# Patient Record
Sex: FEMALE | Race: OTHER | NOT HISPANIC OR LATINO | Employment: FULL TIME | URBAN - METROPOLITAN AREA
[De-identification: names, ages, dates, MRNs, and addresses within clinical notes are randomized per-mention and may not be internally consistent; named-entity substitution may affect disease eponyms.]

---

## 2018-09-11 ENCOUNTER — OFFICE VISIT (OUTPATIENT)
Dept: OBGYN CLINIC | Facility: CLINIC | Age: 33
End: 2018-09-11
Payer: COMMERCIAL

## 2018-09-11 VITALS
SYSTOLIC BLOOD PRESSURE: 122 MMHG | HEIGHT: 64 IN | DIASTOLIC BLOOD PRESSURE: 70 MMHG | BODY MASS INDEX: 35 KG/M2 | WEIGHT: 205 LBS

## 2018-09-11 DIAGNOSIS — N89.8 VAGINAL DISCHARGE: ICD-10-CM

## 2018-09-11 DIAGNOSIS — Z11.3 SCREENING EXAMINATION FOR VENEREAL DISEASE: ICD-10-CM

## 2018-09-11 DIAGNOSIS — Z30.013 ENCOUNTER FOR INITIAL PRESCRIPTION OF INJECTABLE CONTRACEPTIVE: ICD-10-CM

## 2018-09-11 DIAGNOSIS — L29.2 VULVAR ITCHING: ICD-10-CM

## 2018-09-11 DIAGNOSIS — N76.0 BACTERIAL VAGINOSIS: Primary | ICD-10-CM

## 2018-09-11 DIAGNOSIS — B96.89 BACTERIAL VAGINOSIS: Primary | ICD-10-CM

## 2018-09-11 LAB — SL AMB POCT WET MOUNT: ABNORMAL

## 2018-09-11 PROCEDURE — 87210 SMEAR WET MOUNT SALINE/INK: CPT | Performed by: PHYSICIAN ASSISTANT

## 2018-09-11 PROCEDURE — 99204 OFFICE O/P NEW MOD 45 MIN: CPT | Performed by: PHYSICIAN ASSISTANT

## 2018-09-11 RX ORDER — METRONIDAZOLE 7.5 MG/G
1 GEL VAGINAL
Qty: 70 G | Refills: 0 | Status: SHIPPED | OUTPATIENT
Start: 2018-09-11 | End: 2018-09-16

## 2018-09-11 RX ORDER — MEDROXYPROGESTERONE ACETATE 150 MG/ML
150 INJECTION, SUSPENSION INTRAMUSCULAR
Qty: 1 ML | Refills: 2 | Status: SHIPPED | OUTPATIENT
Start: 2018-09-11 | End: 2019-09-24

## 2018-09-11 RX ORDER — CLOBETASOL PROPIONATE 0.5 MG/G
OINTMENT TOPICAL 2 TIMES DAILY
Qty: 45 G | Refills: 0 | Status: SHIPPED | OUTPATIENT
Start: 2018-09-11 | End: 2019-09-24 | Stop reason: SDUPTHER

## 2018-09-11 NOTE — ASSESSMENT & PLAN NOTE
Reviewed bacterial vaginosis on exam today  Will plan to treat with Metrogel x 5 nights  For prevention: Recommend acidophilus or yogurt daily, avoid irritating soaps or lotions, no tight fitted pants or underwear, avoid bubble baths, do not stay in wet swimsuit  Full vaginitis panel done today

## 2018-09-11 NOTE — PROGRESS NOTES
Assessment/Plan:    Encounter for initial prescription of injectable contraceptive  Reviewed longer term birth control options including NuvaRing, Depo Provera, Nexplanon, IUD  Patient most interested in Depo Provera  Reviewed common bleeding patterns as well as risk of weight gain, mood changes, and decreased bone mineral density after 2 years of therapy  Patient just had menses  Will plan to come back tomorrow to get shot, will need to take negative pregnancy test in office  Bacterial vaginosis  Reviewed bacterial vaginosis on exam today  Will plan to treat with Metrogel x 5 nights  For prevention: Recommend acidophilus or yogurt daily, avoid irritating soaps or lotions, no tight fitted pants or underwear, avoid bubble baths, do not stay in wet swimsuit  Full vaginitis panel done today  Vulvar itching  Although clear bacterial vaginosis on exam today, suspect patient may have lichen sclerosus as well with history of her symptoms  Script for Clobetasol ointment given  Aware to apply thin amount only to outside BID for max of 2 weeks at a time  Problem List Items Addressed This Visit     Vulvar itching     Although clear bacterial vaginosis on exam today, suspect patient may have lichen sclerosus as well with history of her symptoms  Script for Clobetasol ointment given  Aware to apply thin amount only to outside BID for max of 2 weeks at a time  Relevant Medications    clobetasol (TEMOVATE) 0 05 % ointment    Bacterial vaginosis - Primary     Reviewed bacterial vaginosis on exam today  Will plan to treat with Metrogel x 5 nights  For prevention: Recommend acidophilus or yogurt daily, avoid irritating soaps or lotions, no tight fitted pants or underwear, avoid bubble baths, do not stay in wet swimsuit  Full vaginitis panel done today            Relevant Medications    metroNIDAZOLE (METROGEL) 0 75 % vaginal gel    Encounter for initial prescription of injectable contraceptive Reviewed longer term birth control options including NuvaRing, Depo Provera, Nexplanon, IUD  Patient most interested in Depo Provera  Reviewed common bleeding patterns as well as risk of weight gain, mood changes, and decreased bone mineral density after 2 years of therapy  Patient just had menses  Will plan to come back tomorrow to get shot, will need to take negative pregnancy test in office  Relevant Medications    medroxyPROGESTERone (DEPO-PROVERA) 150 mg/mL injection      Other Visit Diagnoses     Screening examination for venereal disease        Relevant Orders    HIV 1/2 AG-AB combo    Hepatitis C antibody    RPR    GP Chlamydia + Gonorrhea, Liquid-Based    Vaginal discharge        Relevant Orders    GP Chlamydia + Gonorrhea, Liquid-Based    GP Vaginitis/Vaginosis W/O PAP W/O HPV Plus    POCT wet mount (Completed)            Subjective:      Patient ID: Anita Bryant is a 29 y o  female  HPI  30 yo seen for recurrent vulvar itching and discharge  Patient reports recently moved from [place]  Previous OB/GYN persistently treated for BV, patient reports symptoms would improve during treatment but return as soon as stopped  Reports worse is the uncontrollable itching, wakes her up at night  Reports occasional discharge sometimes thick and white, other times thin  Reports occ fishy odor  Previous obgyn thought it was associated with intercourse, patient stopped intercourse for 6 months and still had issue  Treats with OTC monistat, has been prescribed Diflucan and Metronidazole all oral  Has not used vaginal Metrogel  Denies bowel or bladder issues, no pelvic pain, fever or chills  Would like full STD work up today  Patient would also like to discuss birth control options  Has been on Microgestin in the past, tolerated well but does not want to take a pill everyday  Considering conception in the next 2 years or so     The following portions of the patient's history were reviewed and updated as appropriate:   She  has no past medical history on file  She   Patient Active Problem List    Diagnosis Date Noted    Vulvar itching 09/11/2018    Bacterial vaginosis 09/11/2018    Encounter for initial prescription of injectable contraceptive 09/11/2018     She  has no past surgical history on file  Her family history includes Diabetes in her mother  She  reports that she has never smoked  She has never used smokeless tobacco  She reports that she drinks alcohol  She reports that she does not use drugs  Current Outpatient Prescriptions   Medication Sig Dispense Refill    clobetasol (TEMOVATE) 0 05 % ointment Apply topically 2 (two) times a day Apply thin layer to the affected area twice daily for max of 2 weeks at a time  45 g 0    medroxyPROGESTERone (DEPO-PROVERA) 150 mg/mL injection Inject 1 mL (150 mg total) into a muscle every 3 (three) months 1 mL 2    metroNIDAZOLE (METROGEL) 0 75 % vaginal gel Insert 1 application into the vagina daily at bedtime for 5 days 70 g 0     No current facility-administered medications for this visit  She is allergic to benadryl [diphenhydramine]       Review of Systems   Constitutional: Negative for fatigue, fever and unexpected weight change  HENT: Negative for dental problem and sinus pressure  Eyes: Negative for visual disturbance  Respiratory: Negative for cough, shortness of breath and wheezing  Cardiovascular: Negative for chest pain  Gastrointestinal: Negative for abdominal pain, blood in stool, constipation, diarrhea, nausea and vomiting  Endocrine: Negative for polydipsia  Genitourinary: Positive for vaginal discharge  Negative for difficulty urinating, dyspareunia, dysuria, flank pain, frequency, hematuria, pelvic pain and urgency  Musculoskeletal: Negative for arthralgias and back pain  Neurological: Negative for dizziness, seizures, light-headedness and headaches  Psychiatric/Behavioral: Negative for suicidal ideas   The patient is not nervous/anxious  Objective:      /70 (BP Location: Left arm, Patient Position: Sitting, Cuff Size: Large)   Ht 5' 4" (1 626 m)   Wt 93 kg (205 lb)   BMI 35 19 kg/m²          Physical Exam   Constitutional: She is oriented to person, place, and time  She appears well-developed and well-nourished  Neck: Neck supple  No thyroid mass and no thyromegaly present  Cardiovascular: Normal rate, regular rhythm and normal heart sounds  Exam reveals no gallop and no friction rub  No murmur heard  Pulmonary/Chest: Effort normal and breath sounds normal  No respiratory distress  She has no wheezes  She has no rales  Abdominal: Soft  Normal appearance and bowel sounds are normal  She exhibits no distension and no mass  There is no tenderness  There is no rebound and no guarding  Genitourinary: No breast swelling, tenderness, discharge or bleeding  There is no rash, tenderness, lesion or injury on the right labia  There is no rash, tenderness, lesion or injury on the left labia  Uterus is not deviated, not enlarged and not tender  Cervix exhibits no motion tenderness, no discharge and no friability  Right adnexum displays no mass, no tenderness and no fullness  Left adnexum displays no mass, no tenderness and no fullness  No erythema, tenderness or bleeding in the vagina  No signs of injury around the vagina  Vaginal discharge (thin white discharge in vaginal vault, notable fishy odor  ) found  Genitourinary Comments: Mild hypopigmentation in groin and around clitoris where patient reports most of the itching is occurring  Lymphadenopathy:     She has no cervical adenopathy  Right: No inguinal and no supraclavicular adenopathy present  Left: No inguinal and no supraclavicular adenopathy present  Neurological: She is alert and oriented to person, place, and time  Skin: Skin is warm and dry  No rash noted  No erythema  No pallor     Psychiatric: She has a normal mood and affect  Her behavior is normal  Judgment and thought content normal        Wet mount: abundant clue cells throughout, no budding yeast or hyphae, no trichomonads

## 2018-09-11 NOTE — ASSESSMENT & PLAN NOTE
Although clear bacterial vaginosis on exam today, suspect patient may have lichen sclerosus as well with history of her symptoms  Script for Clobetasol ointment given  Aware to apply thin amount only to outside BID for max of 2 weeks at a time

## 2018-09-11 NOTE — ASSESSMENT & PLAN NOTE
Reviewed longer term birth control options including NuvaRing, Depo Provera, Nexplanon, IUD  Patient most interested in Depo Provera  Reviewed common bleeding patterns as well as risk of weight gain, mood changes, and decreased bone mineral density after 2 years of therapy  Patient just had menses  Will plan to come back tomorrow to get shot, will need to take negative pregnancy test in office

## 2018-09-11 NOTE — PATIENT INSTRUCTIONS
For prevention: Recommend acidophilus or yogurt daily, avoid irritating soaps or lotions, no tight fitted pants or underwear, avoid bubble baths, do not stay in wet swimsuit

## 2018-09-12 ENCOUNTER — CLINICAL SUPPORT (OUTPATIENT)
Dept: OBGYN CLINIC | Facility: CLINIC | Age: 33
End: 2018-09-12
Payer: COMMERCIAL

## 2018-09-12 VITALS
DIASTOLIC BLOOD PRESSURE: 78 MMHG | SYSTOLIC BLOOD PRESSURE: 120 MMHG | BODY MASS INDEX: 34.15 KG/M2 | WEIGHT: 200 LBS | HEIGHT: 64 IN

## 2018-09-12 DIAGNOSIS — Z30.42 ENCOUNTER FOR SURVEILLANCE OF INJECTABLE CONTRACEPTIVE: Primary | ICD-10-CM

## 2018-09-12 LAB — SL AMB POCT URINE HCG: NEGATIVE

## 2018-09-12 PROCEDURE — 96372 THER/PROPH/DIAG INJ SC/IM: CPT | Performed by: OBSTETRICS & GYNECOLOGY

## 2018-09-12 PROCEDURE — 81025 URINE PREGNANCY TEST: CPT

## 2018-09-12 RX ORDER — MEDROXYPROGESTERONE ACETATE 150 MG/ML
150 INJECTION, SUSPENSION INTRAMUSCULAR ONCE
Status: COMPLETED | OUTPATIENT
Start: 2018-09-12 | End: 2018-09-12

## 2018-09-12 RX ORDER — MEDROXYPROGESTERONE ACETATE 150 MG/ML
150 INJECTION, SUSPENSION INTRAMUSCULAR ONCE
Status: CANCELLED | OUTPATIENT
Start: 2018-09-12 | End: 2018-09-12

## 2018-09-12 RX ADMIN — MEDROXYPROGESTERONE ACETATE 150 MG: 150 INJECTION, SUSPENSION INTRAMUSCULAR at 13:39

## 2018-09-12 NOTE — PROGRESS NOTES
Patient came in for first depo  Tolerated well with no adverse reaction  Pregnancy test was negative

## 2018-09-13 LAB
HCV AB S/CO SERPL IA: <0.1 S/CO RATIO (ref 0–0.9)
HIV 1+2 AB+HIV1 P24 AG SERPL QL IA: NON REACTIVE
RPR SER QL: NON REACTIVE
SL AMB INTERPRETATION: NORMAL

## 2018-09-25 LAB
A VAGINAE DNA VAG NAA+PROBE-LOG#: <3.25
A VAGINAE DNA VAG QL NAA+PROBE: NOT DETECTED
BVAB2 DNA VAG QL NAA+PROBE: DETECTED
DEPRECATED C TRACH RRNA XXX QL PRB: NOT DETECTED
G VAGINALIS DNA SPEC QL NAA+PROBE: DETECTED
G VAGINALIS DNA VAG NAA+PROBE-LOG#: ABNORMAL
LACTOBACILLUS DNA VAG NAA+PROBE-LOG#: <3.25
LACTOBACILLUS DNA VAG NAA+PROBE-LOG#: NOT DETECTED
MEGA1 DNA VAG QL NAA+PROBE: DETECTED
N GONORRHOEA DNA UR QL NAA+PROBE: NOT DETECTED
SL AMB C.ALBICANS BY PCR: NOT DETECTED
SL AMB CANDIDA GENUS: NOT DETECTED
T VAGINALIS RRNA SPEC QL NAA+PROBE: NOT DETECTED

## 2018-12-10 ENCOUNTER — CLINICAL SUPPORT (OUTPATIENT)
Dept: OBGYN CLINIC | Facility: CLINIC | Age: 33
End: 2018-12-10
Payer: COMMERCIAL

## 2018-12-10 VITALS
HEIGHT: 63 IN | SYSTOLIC BLOOD PRESSURE: 118 MMHG | BODY MASS INDEX: 37.74 KG/M2 | WEIGHT: 213 LBS | DIASTOLIC BLOOD PRESSURE: 72 MMHG

## 2018-12-10 DIAGNOSIS — Z30.42 ENCOUNTER FOR SURVEILLANCE OF INJECTABLE CONTRACEPTIVE: Primary | ICD-10-CM

## 2018-12-10 PROCEDURE — 96372 THER/PROPH/DIAG INJ SC/IM: CPT | Performed by: OBSTETRICS & GYNECOLOGY

## 2018-12-10 RX ORDER — MEDROXYPROGESTERONE ACETATE 150 MG/ML
150 INJECTION, SUSPENSION INTRAMUSCULAR ONCE
Status: COMPLETED | OUTPATIENT
Start: 2018-12-10 | End: 2018-12-10

## 2018-12-10 RX ADMIN — MEDROXYPROGESTERONE ACETATE 150 MG: 150 INJECTION, SUSPENSION INTRAMUSCULAR at 14:36

## 2019-09-24 ENCOUNTER — ANNUAL EXAM (OUTPATIENT)
Dept: OBGYN CLINIC | Facility: CLINIC | Age: 34
End: 2019-09-24
Payer: COMMERCIAL

## 2019-09-24 VITALS
DIASTOLIC BLOOD PRESSURE: 80 MMHG | BODY MASS INDEX: 30.56 KG/M2 | SYSTOLIC BLOOD PRESSURE: 120 MMHG | HEIGHT: 64 IN | WEIGHT: 179 LBS

## 2019-09-24 DIAGNOSIS — L29.2 VULVAR ITCHING: ICD-10-CM

## 2019-09-24 DIAGNOSIS — Z11.3 SCREENING EXAMINATION FOR VENEREAL DISEASE: ICD-10-CM

## 2019-09-24 DIAGNOSIS — Z01.419 ROUTINE GYNECOLOGICAL EXAMINATION: Primary | ICD-10-CM

## 2019-09-24 PROCEDURE — S0612 ANNUAL GYNECOLOGICAL EXAMINA: HCPCS | Performed by: PHYSICIAN ASSISTANT

## 2019-09-24 RX ORDER — CLOBETASOL PROPIONATE 0.5 MG/G
OINTMENT TOPICAL 2 TIMES DAILY
Qty: 60 G | Refills: 1 | Status: SHIPPED | OUTPATIENT
Start: 2019-09-24 | End: 2020-10-11 | Stop reason: HOSPADM

## 2019-09-24 NOTE — PROGRESS NOTES
Assessment/Plan   Problem List Items Addressed This Visit        Musculoskeletal and Integument    Vulvar itching     Reviewed itching with patient  Recommend continue Clobetasol ointment  Reviewed with patient importance of only using for 2 weeks at a time  If used too often can over thin the tissues and worsen itching  Can use coconut oil in between to help moisturize skin  Relevant Medications    clobetasol (TEMOVATE) 0 05 % ointment       Other    Routine gynecological examination - Primary     Pap guidelines reviewed  Pap with HPV and STD cultures done today  Return to office for annual or as needed  Relevant Orders    GP PAP + HPV PLUS + CT + GC      Other Visit Diagnoses     Screening examination for venereal disease        Relevant Orders    GP PAP + HPV PLUS + CT + GC          Subjective:     Patient ID: Myles Licona is a 34 y o  y o  female  HPI  35 yo seen for annual exam  Menses regular with no issues  Denies bowel or bladder issues  Would like STD testing today  Had been diagnosed with lichen sclerosus on exam last year  Reports clobetasol ointment helps but as soon as she stops feels like it comes right back again  Denies vaginal discharge or odor  Last pap: Unknown  The following portions of the patient's history were reviewed and updated as appropriate:   She  has a past medical history of Lichen sclerosus of female genitalia  She   Patient Active Problem List    Diagnosis Date Noted    Gynecologic exam normal 09/24/2019    Routine gynecological examination 09/24/2019    Vulvar itching 09/11/2018    Bacterial vaginosis 09/11/2018    Encounter for initial prescription of injectable contraceptive 09/11/2018     She  has no past surgical history on file  Her family history includes Diabetes in her mother  She  reports that she has never smoked  She has never used smokeless tobacco  She reports that she drinks alcohol   She reports that she does not use drugs   Current Outpatient Medications   Medication Sig Dispense Refill    clobetasol (TEMOVATE) 0 05 % ointment Apply topically 2 (two) times a day Apply thin layer to the affected area twice daily for max of 2 weeks at a time  60 g 1     No current facility-administered medications for this visit  She is allergic to diphenhydramine       Menstrual History:  OB History        0    Para   0    Term   0       0    AB   0    Living   0       SAB   0    TAB   0    Ectopic   0    Multiple   0    Live Births   0                Menarche age: 17  Patient's last menstrual period was 2019 (exact date)  Period Cycle (Days): 28  Period Duration (Days): 4  Period Pattern: Regular  Menstrual Flow: Moderate, Light  Dysmenorrhea: None      Review of Systems   Constitutional: Negative for fatigue, fever and unexpected weight change  HENT: Negative for dental problem and sinus pressure  Eyes: Negative for visual disturbance  Respiratory: Negative for cough, shortness of breath and wheezing  Cardiovascular: Negative for chest pain  Gastrointestinal: Negative for abdominal pain, blood in stool, constipation, diarrhea, nausea and vomiting  Endocrine: Negative for polydipsia  Genitourinary: Negative for difficulty urinating, dyspareunia, dysuria, frequency, hematuria, pelvic pain and urgency  Musculoskeletal: Negative for arthralgias and back pain  Neurological: Negative for dizziness, seizures, light-headedness and headaches  Psychiatric/Behavioral: Negative for suicidal ideas  The patient is not nervous/anxious  Objective:  Vitals:    19 1312   BP: 120/80   BP Location: Left arm   Patient Position: Sitting   Cuff Size: Large   Weight: 81 2 kg (179 lb)   Height: 5' 4" (1 626 m)      Physical Exam   Constitutional: She is oriented to person, place, and time  She appears well-developed and well-nourished     Genitourinary: Vagina normal and uterus normal  There is no rash, tenderness, lesion, injury or Bartholin's cyst on the right labia  There is no rash, tenderness, lesion, injury or Bartholin's cyst on the left labia  Vagina exhibits no lesion  No erythema, tenderness or bleeding in the vagina  No signs of injury around the vagina  No vaginal discharge found  Right adnexum does not display mass, does not display tenderness and does not display fullness  Left adnexum does not display mass, does not display tenderness and does not display fullness  Cervix does not exhibit motion tenderness, lesion or discharge  Uterus is not enlarged, tender, exhibiting a mass, irregular (is regular) or mobile  HENT:   Head: Normocephalic and atraumatic  Neck: No thyromegaly present  Cardiovascular: Normal rate, regular rhythm and normal heart sounds  Exam reveals no gallop and no friction rub  No murmur heard  Pulmonary/Chest: Effort normal and breath sounds normal  No respiratory distress  She has no wheezes  Right breast exhibits no inverted nipple, no mass, no nipple discharge, no skin change and no tenderness  Left breast exhibits no inverted nipple, no mass, no nipple discharge, no skin change and no tenderness  No breast swelling  Breasts are symmetrical    Abdominal: Soft  She exhibits no distension and no mass  There is no tenderness  There is no rebound and no guarding  No hernia  Lymphadenopathy:     She has no cervical adenopathy  Right: No inguinal adenopathy present  Left: No inguinal adenopathy present  Neurological: She is alert and oriented to person, place, and time  Skin: Skin is warm and dry  Psychiatric: She has a normal mood and affect   Her behavior is normal

## 2019-09-24 NOTE — ASSESSMENT & PLAN NOTE
Pap guidelines reviewed  Pap with HPV and STD cultures done today  Return to office for annual or as needed

## 2019-09-24 NOTE — ASSESSMENT & PLAN NOTE
Reviewed itching with patient  Recommend continue Clobetasol ointment  Reviewed with patient importance of only using for 2 weeks at a time  If used too often can over thin the tissues and worsen itching  Can use coconut oil in between to help moisturize skin

## 2019-09-30 LAB
DEPRECATED C TRACH RRNA XXX QL PRB: NOT DETECTED
HPV HR 12 DNA CVX QL NAA+PROBE: NOT DETECTED
HPV16 DNA SPEC QL NAA+PROBE: NOT DETECTED
HPV18 DNA SPEC QL NAA+PROBE: NOT DETECTED
N GONORRHOEA DNA UR QL NAA+PROBE: NOT DETECTED
THIN PREP CVX: NORMAL

## 2020-02-13 ENCOUNTER — OFFICE VISIT (OUTPATIENT)
Dept: URGENT CARE | Age: 35
End: 2020-02-13
Payer: COMMERCIAL

## 2020-02-13 ENCOUNTER — HOSPITAL ENCOUNTER (EMERGENCY)
Facility: HOSPITAL | Age: 35
Discharge: HOME | End: 2020-02-13
Attending: EMERGENCY MEDICINE | Admitting: EMERGENCY MEDICINE
Payer: COMMERCIAL

## 2020-02-13 VITALS
SYSTOLIC BLOOD PRESSURE: 132 MMHG | RESPIRATION RATE: 16 BRPM | WEIGHT: 170 LBS | BODY MASS INDEX: 29.02 KG/M2 | HEART RATE: 85 BPM | HEIGHT: 64 IN | OXYGEN SATURATION: 100 % | DIASTOLIC BLOOD PRESSURE: 79 MMHG | TEMPERATURE: 97.6 F

## 2020-02-13 VITALS
RESPIRATION RATE: 18 BRPM | TEMPERATURE: 98.2 F | HEART RATE: 74 BPM | OXYGEN SATURATION: 100 % | DIASTOLIC BLOOD PRESSURE: 74 MMHG | SYSTOLIC BLOOD PRESSURE: 127 MMHG

## 2020-02-13 DIAGNOSIS — K62.5 RECTAL BLEEDING: Primary | ICD-10-CM

## 2020-02-13 DIAGNOSIS — K62.5 BRBPR (BRIGHT RED BLOOD PER RECTUM): ICD-10-CM

## 2020-02-13 LAB
ALBUMIN SERPL BCP-MCNC: 3.6 G/DL (ref 3.5–5)
ALP SERPL-CCNC: 63 U/L (ref 46–116)
ALT SERPL W P-5'-P-CCNC: 16 U/L (ref 12–78)
ANION GAP SERPL CALCULATED.3IONS-SCNC: 6 MMOL/L (ref 4–13)
AST SERPL W P-5'-P-CCNC: 11 U/L (ref 5–45)
BASOPHILS # BLD AUTO: 0.03 THOUSANDS/ΜL (ref 0–0.1)
BASOPHILS NFR BLD AUTO: 1 % (ref 0–1)
BILIRUB SERPL-MCNC: 0.17 MG/DL (ref 0.2–1)
BILIRUB UR QL STRIP: NEGATIVE
BUN SERPL-MCNC: 9 MG/DL (ref 5–25)
CALCIUM SERPL-MCNC: 8.7 MG/DL (ref 8.3–10.1)
CHLORIDE SERPL-SCNC: 107 MMOL/L (ref 100–108)
CLARITY UR: CLEAR
CO2 SERPL-SCNC: 24 MMOL/L (ref 21–32)
COLOR UR: YELLOW
COLOR, POC: NORMAL
CREAT SERPL-MCNC: 0.57 MG/DL (ref 0.6–1.3)
EOSINOPHIL # BLD AUTO: 0.09 THOUSAND/ΜL (ref 0–0.61)
EOSINOPHIL NFR BLD AUTO: 1 % (ref 0–6)
ERYTHROCYTE [DISTWIDTH] IN BLOOD BY AUTOMATED COUNT: 15.5 % (ref 11.6–15.1)
EXT PREG TEST URINE: NEGATIVE
EXT. CONTROL ED NAV: NORMAL
GFR SERPL CREATININE-BSD FRML MDRD: 123 ML/MIN/1.73SQ M
GLUCOSE SERPL-MCNC: 79 MG/DL (ref 65–140)
GLUCOSE UR STRIP-MCNC: NEGATIVE MG/DL
HCT VFR BLD AUTO: 34.7 % (ref 34.8–46.1)
HGB BLD-MCNC: 11.1 G/DL (ref 11.5–15.4)
HGB UR QL STRIP.AUTO: NEGATIVE
IMM GRANULOCYTES # BLD AUTO: 0.01 THOUSAND/UL (ref 0–0.2)
IMM GRANULOCYTES NFR BLD AUTO: 0 % (ref 0–2)
KETONES UR STRIP-MCNC: NEGATIVE MG/DL
LEUKOCYTE ESTERASE UR QL STRIP: NEGATIVE
LIPASE SERPL-CCNC: 95 U/L (ref 73–393)
LYMPHOCYTES # BLD AUTO: 2.67 THOUSANDS/ΜL (ref 0.6–4.47)
LYMPHOCYTES NFR BLD AUTO: 41 % (ref 14–44)
MCH RBC QN AUTO: 25.6 PG (ref 26.8–34.3)
MCHC RBC AUTO-ENTMCNC: 32 G/DL (ref 31.4–37.4)
MCV RBC AUTO: 80 FL (ref 82–98)
MONOCYTES # BLD AUTO: 0.55 THOUSAND/ΜL (ref 0.17–1.22)
MONOCYTES NFR BLD AUTO: 9 % (ref 4–12)
NEUTROPHILS # BLD AUTO: 3.12 THOUSANDS/ΜL (ref 1.85–7.62)
NEUTS SEG NFR BLD AUTO: 48 % (ref 43–75)
NITRITE UR QL STRIP: NEGATIVE
NRBC BLD AUTO-RTO: 0 /100 WBCS
PH UR STRIP.AUTO: 7 [PH] (ref 4.5–8)
PLATELET # BLD AUTO: 343 THOUSANDS/UL (ref 149–390)
PMV BLD AUTO: 8.7 FL (ref 8.9–12.7)
POTASSIUM SERPL-SCNC: 3.7 MMOL/L (ref 3.5–5.3)
PROT SERPL-MCNC: 7.5 G/DL (ref 6.4–8.2)
PROT UR STRIP-MCNC: NEGATIVE MG/DL
RBC # BLD AUTO: 4.34 MILLION/UL (ref 3.81–5.12)
SODIUM SERPL-SCNC: 137 MMOL/L (ref 136–145)
SP GR UR STRIP.AUTO: 1.01 (ref 1–1.03)
UROBILINOGEN UR QL STRIP.AUTO: 0.2 E.U./DL
WBC # BLD AUTO: 6.47 THOUSAND/UL (ref 4.31–10.16)

## 2020-02-13 PROCEDURE — 85025 COMPLETE CBC W/AUTO DIFF WBC: CPT | Performed by: EMERGENCY MEDICINE

## 2020-02-13 PROCEDURE — 99213 OFFICE O/P EST LOW 20 MIN: CPT | Performed by: PHYSICIAN ASSISTANT

## 2020-02-13 PROCEDURE — 99285 EMERGENCY DEPT VISIT HI MDM: CPT

## 2020-02-13 PROCEDURE — 36415 COLL VENOUS BLD VENIPUNCTURE: CPT | Performed by: EMERGENCY MEDICINE

## 2020-02-13 PROCEDURE — 81003 URINALYSIS AUTO W/O SCOPE: CPT

## 2020-02-13 PROCEDURE — 99284 EMERGENCY DEPT VISIT MOD MDM: CPT | Performed by: EMERGENCY MEDICINE

## 2020-02-13 PROCEDURE — 81025 URINE PREGNANCY TEST: CPT | Performed by: EMERGENCY MEDICINE

## 2020-02-13 PROCEDURE — 82272 OCCULT BLD FECES 1-3 TESTS: CPT

## 2020-02-13 PROCEDURE — 83690 ASSAY OF LIPASE: CPT | Performed by: EMERGENCY MEDICINE

## 2020-02-13 PROCEDURE — 80053 COMPREHEN METABOLIC PANEL: CPT | Performed by: EMERGENCY MEDICINE

## 2020-02-13 RX ORDER — WITCH HAZEL 5 G/1
CLOTH TOPICAL
COMMUNITY
End: 2020-10-11 | Stop reason: HOSPADM

## 2020-02-13 NOTE — PROGRESS NOTES
Constitutional: Negative for activity change, appetite change, chills, fatigue and fever  HENT: Negative  Eyes: Negative for visual disturbance  Respiratory: Negative for cough and shortness of breath  Cardiovascular: Negative for chest pain  Gastrointestinal: Positive for anal bleeding  Negative for abdominal pain, blood in stool, constipation, diarrhea, nausea, rectal pain and vomiting  Genitourinary: Negative for dysuria, frequency, hematuria, pelvic pain, vaginal bleeding, vaginal discharge and vaginal pain  Musculoskeletal: Negative for back pain  Neurological: Negative for dizziness, syncope, weakness, numbness and headaches  All other systems reviewed and are negative  Current Medications       Current Outpatient Medications:     clobetasol (TEMOVATE) 0 05 % ointment, Apply topically 2 (two) times a day Apply thin layer to the affected area twice daily for max of 2 weeks at a time  , Disp: 60 g, Rfl: 1    Witch Hazel (PREPARATION H) 50 % PADS, Apply topically, Disp: , Rfl:     Current Allergies     Allergies as of 02/13/2020 - Reviewed 02/13/2020   Allergen Reaction Noted    Diphenhydramine Itching 09/11/2018            The following portions of the patient's history were reviewed and updated as appropriate: allergies, current medications, past family history, past medical history, past social history, past surgical history and problem list      Past Medical History:   Diagnosis Date    Lichen sclerosus of female genitalia        History reviewed  No pertinent surgical history  Family History   Problem Relation Age of Onset    Diabetes Mother          Medications have been verified  Objective   /79   Pulse 85   Temp 97 6 °F (36 4 °C)   Resp 16   Ht 5' 4" (1 626 m)   Wt 77 1 kg (170 lb)   LMP 01/15/2020   SpO2 100%   BMI 29 18 kg/m²        Physical Exam     Physical Exam   Constitutional: She is oriented to person, place, and time   She appears well-developed and well-nourished  No distress  HENT:   Head: Normocephalic and atraumatic  Cardiovascular: Normal rate, regular rhythm and normal heart sounds  Pulmonary/Chest: Effort normal and breath sounds normal  She has no wheezes  Abdominal: Soft  Bowel sounds are normal  There is no tenderness  There is no rebound and no guarding  Genitourinary:   Genitourinary Comments: External rectal exam: hemorrhoids are visualized, appear intact  No sign of thromboses or fissures  No obvious bleeding visualized  Internal rectal exam deferred   Neurological: She is alert and oriented to person, place, and time  Skin: Capillary refill takes less than 2 seconds  Psychiatric: She has a normal mood and affect  Her behavior is normal    Nursing note and vitals reviewed

## 2020-02-13 NOTE — DISCHARGE INSTRUCTIONS
You were seen today in the Emergency Department for rectal bleeding  Your workup included urine and blood tests and revealed nothing abnormal at this time  Please follow up with your Primary Care Provider in the next 1-2 days to discuss your symptoms  Please call the [employer] number on this paper to get a primary care provider if you do not have one  Please return to the Emergency Department if you have worsening of your bleeding, abdominal pain, fevers, chills, chest pain, shortness of breath, are unable to eat or drink, or have any other symptoms that concern you  I have attached an educational handout about your symptoms  Please look this over and let your nurse and/or me know if you have any further questions before you leave

## 2020-02-13 NOTE — ED PROVIDER NOTES
History  Chief Complaint   Patient presents with    Rectal Bleeding     States she has been bleeding from her rectum since January 25  Now it is dripping out  Trinh Saba is an 30y o  year old female with no significant PMHx, who presents to the ED today with rectal bleeding for 3 weeks  She has a history of hemorrhoids but had not had associated bleeding until recently  For the last 2-3 weeks she has had 2-3 episodes of farheen rectal bleeding when she goes to the bathroom, not always associated with a bowel movements  Denies had she is having vaginal bleeding  No blood in urine  She has some rectal pain with bowel movements but the pain is unchanged from prior with her hemorrhoids  Has passed occasional clots  The pain is dull in quality, does not radiate, and currently rated mild in severity  She also endorses about 1 week of sharp lower abdominal pain when she eats, and only when she eats  The patient denies fevers, chills, headaches, lightheadedness or syncope, nausea, vomiting, chest pain, shortness of breath, changes with urination, pain anywhere else in body  The patient has no sick contacts, recent travel history, new or changing medications  History provided by:  Patient and medical records   used: No    Rectal Bleeding - Minor   Quality:  Bright red  Amount: Moderate  Duration:  3 weeks  Timing:  Constant  Chronicity:  New  Context: hemorrhoids    Similar prior episodes: yes    Relieved by:  Nothing  Worsened by:  Nothing  Ineffective treatments:  Hemorrhoid cream  Associated symptoms: no abdominal pain, no dizziness, no fever, no light-headedness and no vomiting        Prior to Admission Medications   Prescriptions Last Dose Informant Patient Reported? Taking?    Witch Hazel (PREPARATION H) 50 % PADS   Yes No   Sig: Apply topically   clobetasol (TEMOVATE) 0 05 % ointment   No No   Sig: Apply topically 2 (two) times a day Apply thin layer to the affected area twice daily for max of 2 weeks at a time  Facility-Administered Medications: None       Past Medical History:   Diagnosis Date    Lichen sclerosus of female genitalia        History reviewed  No pertinent surgical history  Family History   Problem Relation Age of Onset    Diabetes Mother      I have reviewed and agree with the history as documented  Social History     Tobacco Use    Smoking status: Never Smoker    Smokeless tobacco: Never Used   Substance Use Topics    Alcohol use: Yes     Frequency: Monthly or less     Drinks per session: 1 or 2     Binge frequency: Never     Comment: social    Drug use: No        Review of Systems   Constitutional: Negative for chills, fatigue and fever  Eyes: Negative for visual disturbance  Respiratory: Negative for cough and shortness of breath  Cardiovascular: Negative for chest pain and palpitations  Gastrointestinal: Positive for blood in stool and hematochezia  Negative for abdominal pain, diarrhea, nausea and vomiting  Genitourinary: Negative for decreased urine volume, difficulty urinating, dysuria, flank pain, vaginal bleeding and vaginal discharge  Musculoskeletal: Negative for back pain and neck pain  Neurological: Negative for dizziness, light-headedness and headaches  All other systems reviewed and are negative        Physical Exam  ED Triage Vitals   Temperature Pulse Respirations Blood Pressure SpO2   02/13/20 1431 02/13/20 1431 02/13/20 1431 02/13/20 1431 02/13/20 1431   98 2 °F (36 8 °C) 96 18 138/78 100 %      Temp Source Heart Rate Source Patient Position - Orthostatic VS BP Location FiO2 (%)   02/13/20 1431 02/13/20 1431 02/13/20 1431 02/13/20 1431 --   Oral Monitor Sitting Right arm       Pain Score       02/13/20 1535       No Pain             Orthostatic Vital Signs  Vitals:    02/13/20 1431 02/13/20 1535 02/13/20 1612   BP: 138/78 120/56 127/74   Pulse: 96 78 74   Patient Position - Orthostatic VS: Sitting Sitting Sitting       Physical Exam   Constitutional: She appears well-developed and well-nourished  No distress  HENT:   Head: Normocephalic and atraumatic  Mouth/Throat: Oropharynx is clear and moist    Eyes: Conjunctivae are normal  No scleral icterus  Neck: Neck supple  No JVD present  No tracheal deviation present  Cardiovascular: Normal rate and regular rhythm  Pulmonary/Chest: Effort normal and breath sounds normal  No respiratory distress  Abdominal: Soft  There is no tenderness  There is no guarding  Genitourinary: Rectal exam shows guaiac positive stool  Genitourinary Comments: External hemorrhoids present, not thrombosed or painful to palpation  No active bleeding visualized   Musculoskeletal: She exhibits no edema or deformity  Neurological: She is alert  Moves all extremities   Skin: Skin is warm and dry  No rash noted  She is not diaphoretic  Psychiatric: She has a normal mood and affect  Her behavior is normal    Nursing note and vitals reviewed        ED Medications  Medications - No data to display    Diagnostic Studies  Results Reviewed     Procedure Component Value Units Date/Time    Comprehensive metabolic panel [[id number]]  (Abnormal) Collected:  02/13/20 1536    Lab Status:  Final result Specimen:  Blood from Arm, Left Updated:  02/13/20 1611     Sodium 137 mmol/L      Potassium 3 7 mmol/L      Chloride 107 mmol/L      CO2 24 mmol/L      ANION GAP 6 mmol/L      BUN 9 mg/dL      Creatinine 0 57 mg/dL      Glucose 79 mg/dL      Calcium 8 7 mg/dL      AST 11 U/L      ALT 16 U/L      Alkaline Phosphatase 63 U/L      Total Protein 7 5 g/dL      Albumin 3 6 g/dL      Total Bilirubin 0 17 mg/dL      eGFR 123 ml/min/1 73sq m     Narrative:       [place] guidelines for Chronic Kidney Disease (CKD):     Stage 1 with normal or high GFR (GFR > 90 mL/min/1 73 square meters)    Stage 2 Mild CKD (GFR = 60-89 mL/min/1 73 square meters)    Stage 3A Moderate CKD (GFR = 45-59 mL/min/1 73 square meters)    Stage 3B Moderate CKD (GFR = 30-44 mL/min/1 73 square meters)    Stage 4 Severe CKD (GFR = 15-29 mL/min/1 73 square meters)    Stage 5 End Stage CKD (GFR <15 mL/min/1 73 square meters)  Note: GFR calculation is accurate only with a steady state creatinine    Lipase [[id number]]  (Normal) Collected:  02/13/20 1536    Lab Status:  Final result Specimen:  Blood from Arm, Left Updated:  02/13/20 1610     Lipase 95 u/L     CBC and differential [[id number]]  (Abnormal) Collected:  02/13/20 1536    Lab Status:  Final result Specimen:  Blood from Arm, Left Updated:  02/13/20 1549     WBC 6 47 Thousand/uL      RBC 4 34 Million/uL      Hemoglobin 11 1 g/dL      Hematocrit 34 7 %      MCV 80 fL      MCH 25 6 pg      MCHC 32 0 g/dL      RDW 15 5 %      MPV 8 7 fL      Platelets 471 Thousands/uL      nRBC 0 /100 WBCs      Neutrophils Relative 48 %      Immat GRANS % 0 %      Lymphocytes Relative 41 %      Monocytes Relative 9 %      Eosinophils Relative 1 %      Basophils Relative 1 %      Neutrophils Absolute 3 12 Thousands/µL      Immature Grans Absolute 0 01 Thousand/uL      Lymphocytes Absolute 2 67 Thousands/µL      Monocytes Absolute 0 55 Thousand/µL      Eosinophils Absolute 0 09 Thousand/µL      Basophils Absolute 0 03 Thousands/µL     POCT urinalysis dipstick [[id number]]  (Normal) Resulted:  02/13/20 1522    Lab Status:  Final result Specimen:  Urine Updated:  02/13/20 1522     Color, UA see chart    POCT pregnancy, urine [[id number]]  (Normal) Resulted:  02/13/20 1522    Lab Status:  Final result Updated:  02/13/20 1522     EXT PREG TEST UR (Ref: Negative) negative     Control valid    Urine Macroscopic, POC [[id number]] Collected:  02/13/20 1523    Lab Status:  Final result Specimen:  Urine Updated:  02/13/20 1520     Color, UA Yellow     Clarity, UA Clear     pH, UA 7 0     Leukocytes, UA Negative     Nitrite, UA Negative     Protein, UA Negative mg/dl      Glucose, UA Negative mg/dl      Ketones, UA Negative mg/dl      Urobilinogen, UA 0 2 E U /dl      Bilirubin, UA Negative     Blood, UA Negative     Specific Gravity, UA 1 015    Narrative:       CLINITEK RESULT                 No orders to display         Procedures  Procedures      ED Course                               MDM  Number of Diagnoses or Management Options  BRBPR (bright red blood per rectum):   Rectal bleeding:   Diagnosis management comments: No pain out of proportion to exam  Pain not colicky  No peritoneal signs on exam   Patient has no known aneurysm, pulse deficit or asymmetry, tearing or ripping pain  No tenderness at McBurney's point and no positive Mary sign, Rovsing sign  No radiation of pain from flank to groin, CVA tenderness, urinary complains, or history of urolithiasis  No history of abdominal trauma or sickle cell disease  Patient does not have a hx of abdominal surgeries  Patient is not pregnant  Vital signs normal, no fever  Patient is able to pass flatus and stool, which is normal for her though sometimes blood streaked, and can tolerate Po           Amount and/or Complexity of Data Reviewed  Clinical lab tests: ordered and reviewed  Tests in the medicine section of CPT®: ordered and reviewed  Review and summarize past medical records: yes  Discuss the patient with other providers: yes    Risk of Complications, Morbidity, and/or Mortality  Presenting problems: low  Diagnostic procedures: low  Management options: low    Patient Progress  Patient progress: stable        Disposition  Final diagnoses:   Rectal bleeding   BRBPR (bright red blood per rectum)     Time reflects when diagnosis was documented in both MDM as applicable and the Disposition within this note     Time User Action Codes Description Comment    2/13/2020  3:27 PM Frannie Credit Add [K62 5] Rectal bleeding     2/13/2020  3:27 PM Frannie Credit Add [K62 5] BRBPR (bright red blood per rectum)       ED Disposition     ED Disposition Condition Date/Time Comment    Discharge Stable Thu Feb 13, 2020  3:25 PM Manuel Arriaga discharge to home/self care  Return precautions given and questions answered  Follow-up Information     Follow up With Specialties Details Why Contact Info Additional Information    [facility]  Call in 1 day To get a primary care provider, if needed [address] [facility] Call in 1 day To discuss your rectal bleeding [address] [address]  Hetal Merritt [facility] [address],  [address], [place], [place], [address]          Discharge Medication List as of 2/13/2020  4:13 PM      CONTINUE these medications which have NOT CHANGED    Details   clobetasol (TEMOVATE) 0 05 % ointment Apply topically 2 (two) times a day Apply thin layer to the affected area twice daily for max of 2 weeks at a time  , Starting Tue 9/24/2019, Normal      Witch Hazel (PREPARATION H) 50 % PADS Apply topically, Historical Med               ED Provider  Attending physically available and evaluated Manuel Arriaga I managed the patient along with the ED Attending      Electronically Signed by         Burgess Alejandra DO  02/13/20 9914

## 2020-02-13 NOTE — PATIENT INSTRUCTIONS
Patient would like to go via private vehicle to [facility] Emergency Department  Please go to the [facility] now for further evaluation and treatment- hospital address verified with the patient  Patient agreed to go immediately to the ED

## 2020-02-17 NOTE — ED ATTENDING ATTESTATION
2/13/2020  I, Francie Cooper MD, saw and evaluated the patient  I have discussed the patient with the resident/non-physician practitioner and agree with the resident's/non-physician practitioner's findings, Plan of Care, and MDM as documented in the resident's/non-physician practitioner's note, except where noted  All available labs and Radiology studies were reviewed  I was present for key portions of any procedure(s) performed by the resident/non-physician practitioner and I was immediately available to provide assistance  At this point I agree with the current assessment done in the Emergency Department  I have conducted an independent evaluation of this patient a history and physical is as follows:    Painless rectal bleeding x 3-4 weeks h/o external hemerrroids no h/x of chrons or UC  Patient denies fevers chills abdominal rectal pain     AFVSS    RRR No murmurs     Lungs CTAB     Abdomen soft NT/ND + BS     Rectal per resident note     Impression Rectal bleeding without sx of anemia fevers abdominal pain  Will check screening cbc to eval for occult anemia  Patine will follow up with GI as outpatient    Return precautions  given        ED Course         Critical Care Time  Procedures

## 2020-03-11 ENCOUNTER — INITIAL PRENATAL (OUTPATIENT)
Dept: OBGYN CLINIC | Facility: CLINIC | Age: 35
End: 2020-03-11

## 2020-03-11 VITALS
BODY MASS INDEX: 29.37 KG/M2 | WEIGHT: 172 LBS | HEIGHT: 64 IN | DIASTOLIC BLOOD PRESSURE: 78 MMHG | SYSTOLIC BLOOD PRESSURE: 118 MMHG

## 2020-03-11 DIAGNOSIS — Z34.01 ENCOUNTER FOR SUPERVISION OF NORMAL FIRST PREGNANCY IN FIRST TRIMESTER: Primary | ICD-10-CM

## 2020-03-11 PROBLEM — Z30.013 ENCOUNTER FOR INITIAL PRESCRIPTION OF INJECTABLE CONTRACEPTIVE: Status: RESOLVED | Noted: 2018-09-11 | Resolved: 2020-03-11

## 2020-03-11 PROBLEM — N76.0 BACTERIAL VAGINOSIS: Status: RESOLVED | Noted: 2018-09-11 | Resolved: 2020-03-11

## 2020-03-11 PROBLEM — B96.89 BACTERIAL VAGINOSIS: Status: RESOLVED | Noted: 2018-09-11 | Resolved: 2020-03-11

## 2020-03-11 PROCEDURE — PNV: Performed by: PHYSICIAN ASSISTANT

## 2020-03-11 NOTE — PROGRESS NOTES
VISIT: (+) n - coming and going; Denies v/HA/cramping/vb/lof/edema/dv/smoking; urine neg/neg  PNVs + DHA - tolerating daily; r/fiona 1 mg folic acid and DHA daily  No FM yet  Infection History: Denies any history of MRSA; Denies any history of STDs, including genital herpes; varicella - as a child; cats - no  Travel history - no recent travel outside the country    TV us done - single viable IUP measuring 18 0 mm by CRL at 8w2d; (+) FHM of 167 bpm; FLOR will be 10/21/20 based on LMP    Initial BW slip given and reviewed - included ferritin, 1 hour GTT and CF screen (check insurance for coverage)  R/fiona genetic screening and info given  Pap and cultures deferred since doen 9/24/19 - WNL (-) HRHPV type 16/18 neg C/G negative - no new sexual partners and no concerns  Delivery care and HIV consent signed; Reviewed cross coverage of on call physicians    Baby and Me first trimester reviewed and completed - pt is planning on breastfeeding  RTO in 4 weeks for routine ob check or sooner if needed

## 2020-03-12 ENCOUNTER — TELEPHONE (OUTPATIENT)
Dept: OBGYN CLINIC | Facility: CLINIC | Age: 35
End: 2020-03-12

## 2020-03-12 NOTE — TELEPHONE ENCOUNTER
Spoke to [place] with [address]  Patient insurance has been active since 7/1/2019  Prenatal and labs would be covered at 70% ded does not apply  Would be covered at 100% after OOP is reached which is 4,850$  0,967 62 has been met  For post op and delivary  Covered at 70%  ded applies  DED is 5,163$ with 0,350 24$ met  Once ded and OOp is met  Member will be covered at 100%  Plan goes by 48/96 rule  After 96 hours, Auth is required  Number to call for auth would be [phone]     Ref # [id number]  [facility] office is in network  (forgot to add initially)

## 2020-03-17 ENCOUNTER — APPOINTMENT (OUTPATIENT)
Dept: LAB | Facility: CLINIC | Age: 35
End: 2020-03-17
Payer: COMMERCIAL

## 2020-03-17 DIAGNOSIS — Z34.01 ENCOUNTER FOR SUPERVISION OF NORMAL FIRST PREGNANCY IN FIRST TRIMESTER: ICD-10-CM

## 2020-03-17 LAB
ABO GROUP BLD: NORMAL
AMORPH PHOS CRY URNS QL MICRO: ABNORMAL /HPF
BACTERIA UR QL AUTO: ABNORMAL /HPF
BASOPHILS # BLD AUTO: 0.03 THOUSANDS/ΜL (ref 0–0.1)
BASOPHILS NFR BLD AUTO: 1 % (ref 0–1)
BILIRUB UR QL STRIP: NEGATIVE
BLD GP AB SCN SERPL QL: NEGATIVE
CLARITY UR: ABNORMAL
COLOR UR: YELLOW
EOSINOPHIL # BLD AUTO: 0.11 THOUSAND/ΜL (ref 0–0.61)
EOSINOPHIL NFR BLD AUTO: 2 % (ref 0–6)
ERYTHROCYTE [DISTWIDTH] IN BLOOD BY AUTOMATED COUNT: 16 % (ref 11.6–15.1)
FERRITIN SERPL-MCNC: 7 NG/ML (ref 8–388)
GLUCOSE 1H P 50 G GLC PO SERPL-MCNC: 64 MG/DL
GLUCOSE UR STRIP-MCNC: NEGATIVE MG/DL
HBV SURFACE AG SER QL: NORMAL
HCT VFR BLD AUTO: 35.8 % (ref 34.8–46.1)
HCV AB SER QL: NORMAL
HGB BLD-MCNC: 11.2 G/DL (ref 11.5–15.4)
HGB UR QL STRIP.AUTO: ABNORMAL
IMM GRANULOCYTES # BLD AUTO: 0.01 THOUSAND/UL (ref 0–0.2)
IMM GRANULOCYTES NFR BLD AUTO: 0 % (ref 0–2)
KETONES UR STRIP-MCNC: NEGATIVE MG/DL
LEUKOCYTE ESTERASE UR QL STRIP: NEGATIVE
LYMPHOCYTES # BLD AUTO: 2.04 THOUSANDS/ΜL (ref 0.6–4.47)
LYMPHOCYTES NFR BLD AUTO: 38 % (ref 14–44)
MCH RBC QN AUTO: 25.7 PG (ref 26.8–34.3)
MCHC RBC AUTO-ENTMCNC: 31.3 G/DL (ref 31.4–37.4)
MCV RBC AUTO: 82 FL (ref 82–98)
MONOCYTES # BLD AUTO: 0.37 THOUSAND/ΜL (ref 0.17–1.22)
MONOCYTES NFR BLD AUTO: 7 % (ref 4–12)
NEUTROPHILS # BLD AUTO: 2.83 THOUSANDS/ΜL (ref 1.85–7.62)
NEUTS SEG NFR BLD AUTO: 52 % (ref 43–75)
NITRITE UR QL STRIP: NEGATIVE
NON-SQ EPI CELLS URNS QL MICRO: ABNORMAL /HPF
NRBC BLD AUTO-RTO: 0 /100 WBCS
PH UR STRIP.AUTO: 8 [PH]
PLATELET # BLD AUTO: 332 THOUSANDS/UL (ref 149–390)
PMV BLD AUTO: 9.1 FL (ref 8.9–12.7)
PROT UR STRIP-MCNC: NEGATIVE MG/DL
RBC # BLD AUTO: 4.36 MILLION/UL (ref 3.81–5.12)
RBC #/AREA URNS AUTO: ABNORMAL /HPF
RH BLD: POSITIVE
RUBV IGG SERPL IA-ACNC: >175 IU/ML
SP GR UR STRIP.AUTO: 1.01 (ref 1–1.03)
SPECIMEN EXPIRATION DATE: NORMAL
UROBILINOGEN UR QL STRIP.AUTO: 0.2 E.U./DL
WBC # BLD AUTO: 5.39 THOUSAND/UL (ref 4.31–10.16)
WBC #/AREA URNS AUTO: ABNORMAL /HPF

## 2020-03-17 PROCEDURE — 86803 HEPATITIS C AB TEST: CPT

## 2020-03-17 PROCEDURE — 80081 OBSTETRIC PANEL INC HIV TSTG: CPT

## 2020-03-17 PROCEDURE — 82728 ASSAY OF FERRITIN: CPT

## 2020-03-17 PROCEDURE — 82950 GLUCOSE TEST: CPT

## 2020-03-17 PROCEDURE — 87086 URINE CULTURE/COLONY COUNT: CPT

## 2020-03-17 PROCEDURE — 81220 CFTR GENE COM VARIANTS: CPT

## 2020-03-17 PROCEDURE — 81001 URINALYSIS AUTO W/SCOPE: CPT | Performed by: PHYSICIAN ASSISTANT

## 2020-03-17 PROCEDURE — 36415 COLL VENOUS BLD VENIPUNCTURE: CPT

## 2020-03-18 LAB
HIV 1+2 AB+HIV1 P24 AG SERPL QL IA: NORMAL
RPR SER QL: NORMAL

## 2020-03-19 ENCOUNTER — TELEPHONE (OUTPATIENT)
Dept: OBGYN CLINIC | Facility: CLINIC | Age: 35
End: 2020-03-19

## 2020-03-19 LAB — BACTERIA UR CULT: NORMAL

## 2020-03-19 NOTE — TELEPHONE ENCOUNTER
[facility] and spoke with Eros  No precert required for CF, predetermination is recommended  Started predetermination with Eros  Clinicals requested and faxed to [phone]   N#[id number]

## 2020-03-19 NOTE — TELEPHONE ENCOUNTER
----- Message from Lorna Chambers sent at 3/11/2020  5:14 PM EDT -----  Regarding: NEEDS PRECERT CF      ----- Message -----  From: Jaylen Faust PA-C  Sent: 3/11/2020  12:11 PM EDT  To: [facility]    NOB done FLOR 10/21/20

## 2020-03-25 LAB
CF COMMENT: NORMAL
CFTR MUT ANL BLD/T: NORMAL

## 2020-04-09 ENCOUNTER — TELEPHONE (OUTPATIENT)
Dept: OBGYN CLINIC | Facility: CLINIC | Age: 35
End: 2020-04-09

## 2020-04-09 ENCOUNTER — TELEPHONE (OUTPATIENT)
Dept: PERINATAL CARE | Facility: CLINIC | Age: 35
End: 2020-04-09

## 2020-04-10 ENCOUNTER — TELEMEDICINE (OUTPATIENT)
Dept: OBGYN CLINIC | Facility: CLINIC | Age: 35
End: 2020-04-10

## 2020-04-10 DIAGNOSIS — Z34.01 ENCOUNTER FOR SUPERVISION OF NORMAL FIRST PREGNANCY IN FIRST TRIMESTER: Primary | ICD-10-CM

## 2020-04-10 PROCEDURE — PNV: Performed by: PHYSICIAN ASSISTANT

## 2020-04-13 ENCOUNTER — TELEPHONE (OUTPATIENT)
Dept: OBGYN CLINIC | Facility: CLINIC | Age: 35
End: 2020-04-13

## 2020-04-17 ENCOUNTER — TELEPHONE (OUTPATIENT)
Dept: PERINATAL CARE | Facility: CLINIC | Age: 35
End: 2020-04-17

## 2020-04-20 ENCOUNTER — ROUTINE PRENATAL (OUTPATIENT)
Dept: PERINATAL CARE | Facility: CLINIC | Age: 35
End: 2020-04-20
Payer: COMMERCIAL

## 2020-04-20 VITALS
BODY MASS INDEX: 30.08 KG/M2 | HEART RATE: 95 BPM | WEIGHT: 176.2 LBS | SYSTOLIC BLOOD PRESSURE: 133 MMHG | TEMPERATURE: 98.7 F | DIASTOLIC BLOOD PRESSURE: 79 MMHG | HEIGHT: 64 IN

## 2020-04-20 DIAGNOSIS — O34.11 UTERINE FIBROID COMPLICATING ANTENATAL CARE, BABY NOT YET DELIVERED IN FIRST TRIMESTER: Primary | ICD-10-CM

## 2020-04-20 DIAGNOSIS — Z3A.13 13 WEEKS GESTATION OF PREGNANCY: ICD-10-CM

## 2020-04-20 DIAGNOSIS — D25.9 UTERINE FIBROID COMPLICATING ANTENATAL CARE, BABY NOT YET DELIVERED IN FIRST TRIMESTER: Primary | ICD-10-CM

## 2020-04-20 DIAGNOSIS — Z34.01 ENCOUNTER FOR SUPERVISION OF NORMAL FIRST PREGNANCY IN FIRST TRIMESTER: ICD-10-CM

## 2020-04-20 DIAGNOSIS — Z36.82 ENCOUNTER FOR NUCHAL TRANSLUCENCY TESTING: ICD-10-CM

## 2020-04-20 PROCEDURE — 76813 OB US NUCHAL MEAS 1 GEST: CPT | Performed by: OBSTETRICS & GYNECOLOGY

## 2020-04-20 PROCEDURE — 99241 PR OFFICE CONSULTATION NEW/ESTAB PATIENT 15 MIN: CPT | Performed by: OBSTETRICS & GYNECOLOGY

## 2020-04-20 PROCEDURE — 76801 OB US < 14 WKS SINGLE FETUS: CPT | Performed by: OBSTETRICS & GYNECOLOGY

## 2020-04-20 RX ORDER — ASPIRIN 81 MG/1
162 TABLET ORAL DAILY
Qty: 180 TABLET | Refills: 3 | Status: SHIPPED | OUTPATIENT
Start: 2020-04-20 | End: 2020-10-11 | Stop reason: HOSPADM

## 2020-04-28 ENCOUNTER — TELEPHONE (OUTPATIENT)
Dept: PERINATAL CARE | Facility: CLINIC | Age: 35
End: 2020-04-28

## 2020-05-07 ENCOUNTER — TELEMEDICINE (OUTPATIENT)
Dept: OBGYN CLINIC | Facility: CLINIC | Age: 35
End: 2020-05-07

## 2020-05-07 DIAGNOSIS — Z34.02 ENCOUNTER FOR SUPERVISION OF NORMAL FIRST PREGNANCY IN SECOND TRIMESTER: ICD-10-CM

## 2020-05-07 DIAGNOSIS — D25.9 UTERINE FIBROID COMPLICATING ANTENATAL CARE, BABY NOT YET DELIVERED IN FIRST TRIMESTER: ICD-10-CM

## 2020-05-07 DIAGNOSIS — Z3A.16 16 WEEKS GESTATION OF PREGNANCY: Primary | ICD-10-CM

## 2020-05-07 DIAGNOSIS — O34.11 UTERINE FIBROID COMPLICATING ANTENATAL CARE, BABY NOT YET DELIVERED IN FIRST TRIMESTER: ICD-10-CM

## 2020-05-07 PROBLEM — Z01.419 ROUTINE GYNECOLOGICAL EXAMINATION: Status: RESOLVED | Noted: 2019-09-24 | Resolved: 2020-05-07

## 2020-05-07 PROBLEM — Z01.419 GYNECOLOGIC EXAM NORMAL: Status: RESOLVED | Noted: 2019-09-24 | Resolved: 2020-05-07

## 2020-05-07 PROCEDURE — PNV: Performed by: OBSTETRICS & GYNECOLOGY

## 2020-05-18 ENCOUNTER — TRANSCRIBE ORDERS (OUTPATIENT)
Dept: LAB | Facility: CLINIC | Age: 35
End: 2020-05-18

## 2020-05-18 DIAGNOSIS — Z36.9 UNSPECIFIED ANTENATAL SCREENING: ICD-10-CM

## 2020-05-18 DIAGNOSIS — Z33.1 PREGNANT STATE, INCIDENTAL: Primary | ICD-10-CM

## 2020-05-28 ENCOUNTER — TELEPHONE (OUTPATIENT)
Dept: PERINATAL CARE | Facility: OTHER | Age: 35
End: 2020-05-28

## 2020-06-03 ENCOUNTER — ROUTINE PRENATAL (OUTPATIENT)
Dept: OBGYN CLINIC | Facility: CLINIC | Age: 35
End: 2020-06-03

## 2020-06-03 VITALS
WEIGHT: 191 LBS | TEMPERATURE: 97.9 F | DIASTOLIC BLOOD PRESSURE: 80 MMHG | SYSTOLIC BLOOD PRESSURE: 124 MMHG | BODY MASS INDEX: 32.79 KG/M2

## 2020-06-03 DIAGNOSIS — D25.9 UTERINE FIBROID COMPLICATING ANTENATAL CARE, BABY NOT YET DELIVERED IN FIRST TRIMESTER: ICD-10-CM

## 2020-06-03 DIAGNOSIS — O34.11 UTERINE FIBROID COMPLICATING ANTENATAL CARE, BABY NOT YET DELIVERED IN FIRST TRIMESTER: ICD-10-CM

## 2020-06-03 DIAGNOSIS — Z3A.20 20 WEEKS GESTATION OF PREGNANCY: Primary | ICD-10-CM

## 2020-06-03 PROCEDURE — PNV: Performed by: PHYSICIAN ASSISTANT

## 2020-06-03 RX ORDER — FERROUS SULFATE 325(65) MG
325 TABLET ORAL
COMMUNITY
End: 2020-10-11 | Stop reason: HOSPADM

## 2020-06-08 ENCOUNTER — TELEPHONE (OUTPATIENT)
Dept: PERINATAL CARE | Facility: CLINIC | Age: 35
End: 2020-06-08

## 2020-06-09 ENCOUNTER — ROUTINE PRENATAL (OUTPATIENT)
Dept: PERINATAL CARE | Facility: CLINIC | Age: 35
End: 2020-06-09
Payer: COMMERCIAL

## 2020-06-09 VITALS
HEIGHT: 64 IN | SYSTOLIC BLOOD PRESSURE: 135 MMHG | WEIGHT: 190.4 LBS | DIASTOLIC BLOOD PRESSURE: 84 MMHG | HEART RATE: 108 BPM | TEMPERATURE: 98 F | BODY MASS INDEX: 32.5 KG/M2

## 2020-06-09 DIAGNOSIS — Z3A.20 20 WEEKS GESTATION OF PREGNANCY: ICD-10-CM

## 2020-06-09 DIAGNOSIS — Z36.86 ENCOUNTER FOR ANTENATAL SCREENING FOR CERVICAL LENGTH: ICD-10-CM

## 2020-06-09 DIAGNOSIS — O35.8XX0 FETAL CARDIAC ECHOGENIC FOCUS, ANTEPARTUM, SINGLE OR UNSPECIFIED FETUS: Primary | ICD-10-CM

## 2020-06-09 PROBLEM — O35.BXX0 FETAL CARDIAC ECHOGENIC FOCUS, ANTEPARTUM: Status: ACTIVE | Noted: 2020-06-09

## 2020-06-09 PROCEDURE — 76817 TRANSVAGINAL US OBSTETRIC: CPT | Performed by: OBSTETRICS & GYNECOLOGY

## 2020-06-09 PROCEDURE — 99212 OFFICE O/P EST SF 10 MIN: CPT | Performed by: OBSTETRICS & GYNECOLOGY

## 2020-06-09 PROCEDURE — 76811 OB US DETAILED SNGL FETUS: CPT | Performed by: OBSTETRICS & GYNECOLOGY

## 2020-07-01 ENCOUNTER — TELEPHONE (OUTPATIENT)
Dept: OBGYN CLINIC | Facility: CLINIC | Age: 35
End: 2020-07-01

## 2020-07-01 NOTE — TELEPHONE ENCOUNTER
Per Marie's precert, insurance effective 7-1-2019; ded $2350; oop $4850; 70/30  See scanned in sheet in patient documents under registration

## 2020-07-02 ENCOUNTER — ROUTINE PRENATAL (OUTPATIENT)
Dept: OBGYN CLINIC | Facility: CLINIC | Age: 35
End: 2020-07-02

## 2020-07-02 ENCOUNTER — TELEPHONE (OUTPATIENT)
Dept: OBGYN CLINIC | Facility: CLINIC | Age: 35
End: 2020-07-02

## 2020-07-02 VITALS
BODY MASS INDEX: 33.46 KG/M2 | WEIGHT: 196 LBS | HEIGHT: 64 IN | TEMPERATURE: 98 F | DIASTOLIC BLOOD PRESSURE: 64 MMHG | SYSTOLIC BLOOD PRESSURE: 120 MMHG

## 2020-07-02 DIAGNOSIS — Z34.02 ENCOUNTER FOR SUPERVISION OF NORMAL FIRST PREGNANCY IN SECOND TRIMESTER: Primary | ICD-10-CM

## 2020-07-02 PROBLEM — Z3A.20 20 WEEKS GESTATION OF PREGNANCY: Status: RESOLVED | Noted: 2020-05-07 | Resolved: 2020-07-02

## 2020-07-02 PROCEDURE — PNV: Performed by: PHYSICIAN ASSISTANT

## 2020-07-02 NOTE — PROGRESS NOTES
VISIT: (+) HA - tolerating with tylenol if needed; (+) edema - feet sometimes;  Denies n/v/cramping/vb/lof/dv/smoking; urine trace/neg  PNVs + DHA - tolerating daily  Good FM  Follow-up growth at 32 weeks    Slip for 28 week labs given - CBC, iron panel, 1 hour GTT, RPR  RTO in 4 weeks for routine ob check or sooner if needed

## 2020-07-02 NOTE — TELEPHONE ENCOUNTER
7-2-20, patient gave us her [employer] today  Scanned into her chart  This will be patient's secondary insurance  Both provider and hospital are in network per [facility], page 5

## 2020-07-22 ENCOUNTER — TELEPHONE (OUTPATIENT)
Dept: OBGYN CLINIC | Facility: CLINIC | Age: 35
End: 2020-07-22

## 2020-07-22 ENCOUNTER — TRANSCRIBE ORDERS (OUTPATIENT)
Dept: LAB | Facility: CLINIC | Age: 35
End: 2020-07-22

## 2020-07-22 DIAGNOSIS — L29.9 ITCHING: Primary | ICD-10-CM

## 2020-07-22 DIAGNOSIS — Z3A.27 27 WEEKS GESTATION OF PREGNANCY: ICD-10-CM

## 2020-07-22 NOTE — TELEPHONE ENCOUNTER
LMOM  Pt can not go to [facility] office with secondary insurance [place]  Will offer pt to complete BW, CMP and bile acids

## 2020-07-22 NOTE — TELEPHONE ENCOUNTER
Two weeks ago started on elbow  Pt now has itchiness and rash on elbows, knees, thighs and feet  Pt has tried cortisone cream not relieving itch  Reviewed with Asha Hager recommends pt be seen if pt can not be seen send for blood work   Pt is able to come to apt today in [facility] office at 10am

## 2020-08-07 ENCOUNTER — ROUTINE PRENATAL (OUTPATIENT)
Dept: OBGYN CLINIC | Facility: CLINIC | Age: 35
End: 2020-08-07

## 2020-08-07 VITALS
SYSTOLIC BLOOD PRESSURE: 144 MMHG | TEMPERATURE: 97.8 F | DIASTOLIC BLOOD PRESSURE: 86 MMHG | HEIGHT: 64 IN | BODY MASS INDEX: 34.62 KG/M2 | WEIGHT: 202.8 LBS

## 2020-08-07 DIAGNOSIS — O34.11 UTERINE FIBROID COMPLICATING ANTENATAL CARE, BABY NOT YET DELIVERED IN FIRST TRIMESTER: ICD-10-CM

## 2020-08-07 DIAGNOSIS — Z3A.29 PREGNANCY WITH 29 COMPLETED WEEKS GESTATION: Primary | ICD-10-CM

## 2020-08-07 DIAGNOSIS — D25.9 UTERINE FIBROID COMPLICATING ANTENATAL CARE, BABY NOT YET DELIVERED IN FIRST TRIMESTER: ICD-10-CM

## 2020-08-07 DIAGNOSIS — O35.8XX0 FETAL CARDIAC ECHOGENIC FOCUS, ANTEPARTUM, SINGLE OR UNSPECIFIED FETUS: ICD-10-CM

## 2020-08-07 PROCEDURE — PNV: Performed by: OBSTETRICS & GYNECOLOGY

## 2020-08-07 NOTE — PROGRESS NOTES
Patient reports good fm, no n/v, headache, cramping, bleeding, loss of fluid, edema, dom violence, or smoking  sae pnv urine neg/neg given 30 week pack would like to wait till next visit for tdap

## 2020-08-21 ENCOUNTER — ROUTINE PRENATAL (OUTPATIENT)
Dept: OBGYN CLINIC | Facility: CLINIC | Age: 35
End: 2020-08-21
Payer: COMMERCIAL

## 2020-08-21 VITALS
BODY MASS INDEX: 35.02 KG/M2 | DIASTOLIC BLOOD PRESSURE: 80 MMHG | TEMPERATURE: 98.2 F | SYSTOLIC BLOOD PRESSURE: 118 MMHG | WEIGHT: 204 LBS

## 2020-08-21 DIAGNOSIS — O35.8XX0 FETAL CARDIAC ECHOGENIC FOCUS, ANTEPARTUM, SINGLE OR UNSPECIFIED FETUS: ICD-10-CM

## 2020-08-21 DIAGNOSIS — Z34.03 ENCOUNTER FOR SUPERVISION OF NORMAL FIRST PREGNANCY IN THIRD TRIMESTER: Primary | ICD-10-CM

## 2020-08-21 DIAGNOSIS — Z23 NEED FOR TDAP VACCINATION: ICD-10-CM

## 2020-08-21 DIAGNOSIS — O34.11 UTERINE FIBROID COMPLICATING ANTENATAL CARE, BABY NOT YET DELIVERED IN FIRST TRIMESTER: ICD-10-CM

## 2020-08-21 DIAGNOSIS — D25.9 UTERINE FIBROID COMPLICATING ANTENATAL CARE, BABY NOT YET DELIVERED IN FIRST TRIMESTER: ICD-10-CM

## 2020-08-21 PROCEDURE — PNV: Performed by: PHYSICIAN ASSISTANT

## 2020-08-21 PROCEDURE — 90471 IMMUNIZATION ADMIN: CPT | Performed by: PHYSICIAN ASSISTANT

## 2020-08-21 PROCEDURE — 90715 TDAP VACCINE 7 YRS/> IM: CPT | Performed by: PHYSICIAN ASSISTANT

## 2020-08-21 NOTE — PROGRESS NOTES
Visit: Good FM, reports significant heart burn, reviewed foods to avoid  Tried Pepcid with little relief  Recommend Pepcid as well as OTC Prilosec  Can take tums as needed  No FM, N/V, HA, cramping, VB, LOF, edema, domestic violence, or smoking  Tolerating PNV  Tdap given today  Continue routine prenatal care  Return to office in 2 weeks for ob check

## 2020-08-31 ENCOUNTER — TELEPHONE (OUTPATIENT)
Dept: PERINATAL CARE | Facility: OTHER | Age: 35
End: 2020-08-31

## 2020-08-31 NOTE — TELEPHONE ENCOUNTER
-------------------------------------------------------------    Attempted to reach patient by phone and left voicemail to confirm appointment for MFM ultrasound  1 support person ( must be over the age of 15) may accompany you for your appointment  If you or your support person have traveled outside the state in the past 2 weeks, please call and notify our office today #[phone]  You and your support person must wear a mask ,covering nose and mouth,during your entire visit  You and your support person will have temperature screened upon arrival     To minimize your exposure in our waiting room, please call our office prior to entering the building  Check in and rooming questions will be done via phone  We will give you directions when to enter for your appointment  Inside office # provided:  [facility] line: [phone]  [place] line:  [phone]  [facility] line:  [address] line:  [phone]  [facility] line:  [phone]  [place] line:  [phone]    IF you are not feeling well- cough, fever, shortness of breath or any flu like symptoms, contact your primary care physician or 1[facility] Basilio Headley    Any questions with these instructions please call Maternal Fetal Medicine nurse line today @ # [phone]

## 2020-09-01 ENCOUNTER — ULTRASOUND (OUTPATIENT)
Dept: PERINATAL CARE | Facility: OTHER | Age: 35
End: 2020-09-01
Payer: COMMERCIAL

## 2020-09-01 VITALS
HEART RATE: 120 BPM | TEMPERATURE: 98 F | WEIGHT: 205.47 LBS | SYSTOLIC BLOOD PRESSURE: 126 MMHG | BODY MASS INDEX: 35.08 KG/M2 | DIASTOLIC BLOOD PRESSURE: 86 MMHG | HEIGHT: 64 IN

## 2020-09-01 DIAGNOSIS — O34.13 UTERINE FIBROID COMPLICATING ANTENATAL CARE, BABY NOT YET DELIVERED IN THIRD TRIMESTER: Primary | ICD-10-CM

## 2020-09-01 DIAGNOSIS — Z3A.33 33 WEEKS GESTATION OF PREGNANCY: ICD-10-CM

## 2020-09-01 DIAGNOSIS — D25.9 UTERINE FIBROID COMPLICATING ANTENATAL CARE, BABY NOT YET DELIVERED IN THIRD TRIMESTER: Primary | ICD-10-CM

## 2020-09-01 PROCEDURE — 76816 OB US FOLLOW-UP PER FETUS: CPT | Performed by: OBSTETRICS & GYNECOLOGY

## 2020-09-04 ENCOUNTER — ROUTINE PRENATAL (OUTPATIENT)
Dept: OBGYN CLINIC | Facility: CLINIC | Age: 35
End: 2020-09-04

## 2020-09-04 VITALS
WEIGHT: 206 LBS | HEIGHT: 64 IN | SYSTOLIC BLOOD PRESSURE: 112 MMHG | DIASTOLIC BLOOD PRESSURE: 76 MMHG | BODY MASS INDEX: 35.17 KG/M2 | TEMPERATURE: 98.4 F

## 2020-09-04 DIAGNOSIS — O35.8XX0 FETAL CARDIAC ECHOGENIC FOCUS, ANTEPARTUM, SINGLE OR UNSPECIFIED FETUS: ICD-10-CM

## 2020-09-04 DIAGNOSIS — Z3A.33 PREGNANCY WITH 33 COMPLETED WEEKS GESTATION: Primary | ICD-10-CM

## 2020-09-04 DIAGNOSIS — D25.9 UTERINE FIBROID COMPLICATING ANTENATAL CARE, BABY NOT YET DELIVERED IN FIRST TRIMESTER: ICD-10-CM

## 2020-09-04 DIAGNOSIS — O34.11 UTERINE FIBROID COMPLICATING ANTENATAL CARE, BABY NOT YET DELIVERED IN FIRST TRIMESTER: ICD-10-CM

## 2020-09-04 PROBLEM — O34.13 UTERINE FIBROID COMPLICATING ANTENATAL CARE, BABY NOT YET DELIVERED IN THIRD TRIMESTER: Status: ACTIVE | Noted: 2020-04-20

## 2020-09-04 PROCEDURE — PNV: Performed by: OBSTETRICS & GYNECOLOGY

## 2020-09-04 NOTE — PROGRESS NOTES
The patient was seen today for an ultrasound  Please see ultrasound report (located under Ob Procedures) for additional details  Thank you very much for allowing us to participate in the care of this very nice patient  Should you have any questions, please do not hesitate to contact me  Rubin Romano MD [address]  Attending Physician, [place]

## 2020-09-04 NOTE — PROGRESS NOTES
Patient reports good fm, no n/v, headache, cramping, bleeding, loss of fluid, edema, dom violence, or smoking  sae pnv urine neg/neg return in 2 weeks or sooner as needed

## 2020-09-17 ENCOUNTER — ROUTINE PRENATAL (OUTPATIENT)
Dept: OBGYN CLINIC | Facility: CLINIC | Age: 35
End: 2020-09-17

## 2020-09-17 VITALS
TEMPERATURE: 98.1 F | HEIGHT: 64 IN | BODY MASS INDEX: 36.37 KG/M2 | SYSTOLIC BLOOD PRESSURE: 128 MMHG | WEIGHT: 213 LBS | DIASTOLIC BLOOD PRESSURE: 82 MMHG

## 2020-09-17 DIAGNOSIS — Z34.03 ENCOUNTER FOR SUPERVISION OF NORMAL FIRST PREGNANCY IN THIRD TRIMESTER: Primary | ICD-10-CM

## 2020-09-17 DIAGNOSIS — O34.13 UTERINE FIBROID COMPLICATING ANTENATAL CARE, BABY NOT YET DELIVERED IN THIRD TRIMESTER: ICD-10-CM

## 2020-09-17 DIAGNOSIS — D25.9 UTERINE FIBROID COMPLICATING ANTENATAL CARE, BABY NOT YET DELIVERED IN THIRD TRIMESTER: ICD-10-CM

## 2020-09-17 DIAGNOSIS — O35.8XX0 FETAL CARDIAC ECHOGENIC FOCUS, ANTEPARTUM, SINGLE OR UNSPECIFIED FETUS: ICD-10-CM

## 2020-09-17 PROCEDURE — PNV: Performed by: PHYSICIAN ASSISTANT

## 2020-09-17 NOTE — PROGRESS NOTES
Visit: Good FM, occasional swelling  No N/V, HA, cramping, VB, LOF, domestic violence, or smoking  Tolerating PNV  Continue routine prenatal care  Return to office in 1 week for ob check/GBS

## 2020-09-25 ENCOUNTER — ROUTINE PRENATAL (OUTPATIENT)
Dept: OBGYN CLINIC | Facility: CLINIC | Age: 35
End: 2020-09-25

## 2020-09-25 VITALS
SYSTOLIC BLOOD PRESSURE: 132 MMHG | HEIGHT: 64 IN | TEMPERATURE: 96.2 F | BODY MASS INDEX: 37.05 KG/M2 | DIASTOLIC BLOOD PRESSURE: 84 MMHG | WEIGHT: 217 LBS

## 2020-09-25 DIAGNOSIS — O34.13 UTERINE FIBROID COMPLICATING ANTENATAL CARE, BABY NOT YET DELIVERED IN THIRD TRIMESTER: ICD-10-CM

## 2020-09-25 DIAGNOSIS — Z34.03 ENCOUNTER FOR SUPERVISION OF NORMAL FIRST PREGNANCY IN THIRD TRIMESTER: Primary | ICD-10-CM

## 2020-09-25 DIAGNOSIS — O35.8XX0 FETAL CARDIAC ECHOGENIC FOCUS, ANTEPARTUM, SINGLE OR UNSPECIFIED FETUS: ICD-10-CM

## 2020-09-25 DIAGNOSIS — D25.9 UTERINE FIBROID COMPLICATING ANTENATAL CARE, BABY NOT YET DELIVERED IN THIRD TRIMESTER: ICD-10-CM

## 2020-09-25 PROBLEM — Z34.02 ENCOUNTER FOR SUPERVISION OF NORMAL FIRST PREGNANCY IN SECOND TRIMESTER: Status: RESOLVED | Noted: 2020-04-10 | Resolved: 2020-09-25

## 2020-09-25 PROCEDURE — PNV: Performed by: OBSTETRICS & GYNECOLOGY

## 2020-09-25 PROCEDURE — 87653 STREP B DNA AMP PROBE: CPT | Performed by: OBSTETRICS & GYNECOLOGY

## 2020-09-27 LAB — GP B STREP DNA SPEC QL NAA+PROBE: NORMAL

## 2020-10-02 ENCOUNTER — ROUTINE PRENATAL (OUTPATIENT)
Dept: OBGYN CLINIC | Facility: CLINIC | Age: 35
End: 2020-10-02
Payer: COMMERCIAL

## 2020-10-02 VITALS
HEIGHT: 64 IN | DIASTOLIC BLOOD PRESSURE: 84 MMHG | SYSTOLIC BLOOD PRESSURE: 122 MMHG | BODY MASS INDEX: 36.74 KG/M2 | WEIGHT: 215.2 LBS | TEMPERATURE: 96.1 F

## 2020-10-02 DIAGNOSIS — Z23 NEED FOR INFLUENZA VACCINATION: ICD-10-CM

## 2020-10-02 DIAGNOSIS — Z34.03 ENCOUNTER FOR SUPERVISION OF NORMAL FIRST PREGNANCY IN THIRD TRIMESTER: Primary | ICD-10-CM

## 2020-10-02 PROCEDURE — 90471 IMMUNIZATION ADMIN: CPT | Performed by: PHYSICIAN ASSISTANT

## 2020-10-02 PROCEDURE — PNV: Performed by: PHYSICIAN ASSISTANT

## 2020-10-02 PROCEDURE — 90686 IIV4 VACC NO PRSV 0.5 ML IM: CPT | Performed by: PHYSICIAN ASSISTANT

## 2020-10-08 ENCOUNTER — ANESTHESIA (INPATIENT)
Dept: ANESTHESIOLOGY | Facility: HOSPITAL | Age: 35
End: 2020-10-08
Payer: COMMERCIAL

## 2020-10-08 ENCOUNTER — HOSPITAL ENCOUNTER (INPATIENT)
Facility: HOSPITAL | Age: 35
LOS: 3 days | Discharge: HOME | End: 2020-10-11
Attending: OBSTETRICS & GYNECOLOGY | Admitting: OBSTETRICS & GYNECOLOGY
Payer: COMMERCIAL

## 2020-10-08 ENCOUNTER — NURSE TRIAGE (OUTPATIENT)
Dept: OTHER | Facility: OTHER | Age: 35
End: 2020-10-08

## 2020-10-08 ENCOUNTER — ANESTHESIA EVENT (INPATIENT)
Dept: ANESTHESIOLOGY | Facility: HOSPITAL | Age: 35
End: 2020-10-08
Payer: COMMERCIAL

## 2020-10-08 PROBLEM — Z3A.38 38 WEEKS GESTATION OF PREGNANCY: Status: ACTIVE | Noted: 2020-10-08

## 2020-10-08 LAB
ABO GROUP BLD: NORMAL
ALBUMIN SERPL BCP-MCNC: 2.6 G/DL (ref 3.5–5)
ALP SERPL-CCNC: 146 U/L (ref 46–116)
ALT SERPL W P-5'-P-CCNC: 25 U/L (ref 12–78)
ANION GAP SERPL CALCULATED.3IONS-SCNC: 9 MMOL/L (ref 4–13)
AST SERPL W P-5'-P-CCNC: 19 U/L (ref 5–45)
BILIRUB SERPL-MCNC: 0.19 MG/DL (ref 0.2–1)
BLD GP AB SCN SERPL QL: NEGATIVE
BUN SERPL-MCNC: 6 MG/DL (ref 5–25)
CALCIUM ALBUM COR SERPL-MCNC: 9.9 MG/DL (ref 8.3–10.1)
CALCIUM SERPL-MCNC: 8.8 MG/DL (ref 8.3–10.1)
CHLORIDE SERPL-SCNC: 104 MMOL/L (ref 100–108)
CO2 SERPL-SCNC: 22 MMOL/L (ref 21–32)
CREAT SERPL-MCNC: 0.46 MG/DL (ref 0.6–1.3)
CREAT UR-MCNC: 66 MG/DL
ERYTHROCYTE [DISTWIDTH] IN BLOOD BY AUTOMATED COUNT: 13.3 % (ref 11.6–15.1)
GFR SERPL CREATININE-BSD FRML MDRD: 132 ML/MIN/1.73SQ M
GLUCOSE SERPL-MCNC: 94 MG/DL (ref 65–140)
HCT VFR BLD AUTO: 40 % (ref 34.8–46.1)
HGB BLD-MCNC: 13.5 G/DL (ref 11.5–15.4)
MCH RBC QN AUTO: 30 PG (ref 26.8–34.3)
MCHC RBC AUTO-ENTMCNC: 33.8 G/DL (ref 31.4–37.4)
MCV RBC AUTO: 89 FL (ref 82–98)
PLATELET # BLD AUTO: 291 THOUSANDS/UL (ref 149–390)
PMV BLD AUTO: 9.4 FL (ref 8.9–12.7)
POTASSIUM SERPL-SCNC: 3.7 MMOL/L (ref 3.5–5.3)
PROT SERPL-MCNC: 7 G/DL (ref 6.4–8.2)
PROT UR-MCNC: 33 MG/DL
PROT/CREAT UR: 0.5 MG/G{CREAT} (ref 0–0.1)
RBC # BLD AUTO: 4.5 MILLION/UL (ref 3.81–5.12)
RH BLD: POSITIVE
SODIUM SERPL-SCNC: 135 MMOL/L (ref 136–145)
SPECIMEN EXPIRATION DATE: NORMAL
WBC # BLD AUTO: 9.21 THOUSAND/UL (ref 4.31–10.16)

## 2020-10-08 PROCEDURE — NC001 PR NO CHARGE: Performed by: OBSTETRICS & GYNECOLOGY

## 2020-10-08 PROCEDURE — 86592 SYPHILIS TEST NON-TREP QUAL: CPT | Performed by: STUDENT IN AN ORGANIZED HEALTH CARE EDUCATION/TRAINING PROGRAM

## 2020-10-08 PROCEDURE — 85027 COMPLETE CBC AUTOMATED: CPT | Performed by: STUDENT IN AN ORGANIZED HEALTH CARE EDUCATION/TRAINING PROGRAM

## 2020-10-08 PROCEDURE — 86900 BLOOD TYPING SEROLOGIC ABO: CPT | Performed by: STUDENT IN AN ORGANIZED HEALTH CARE EDUCATION/TRAINING PROGRAM

## 2020-10-08 PROCEDURE — 80053 COMPREHEN METABOLIC PANEL: CPT | Performed by: STUDENT IN AN ORGANIZED HEALTH CARE EDUCATION/TRAINING PROGRAM

## 2020-10-08 PROCEDURE — 84156 ASSAY OF PROTEIN URINE: CPT | Performed by: STUDENT IN AN ORGANIZED HEALTH CARE EDUCATION/TRAINING PROGRAM

## 2020-10-08 PROCEDURE — 86850 RBC ANTIBODY SCREEN: CPT | Performed by: STUDENT IN AN ORGANIZED HEALTH CARE EDUCATION/TRAINING PROGRAM

## 2020-10-08 PROCEDURE — 82570 ASSAY OF URINE CREATININE: CPT | Performed by: STUDENT IN AN ORGANIZED HEALTH CARE EDUCATION/TRAINING PROGRAM

## 2020-10-08 PROCEDURE — G0463 HOSPITAL OUTPT CLINIC VISIT: HCPCS

## 2020-10-08 PROCEDURE — 86901 BLOOD TYPING SEROLOGIC RH(D): CPT | Performed by: STUDENT IN AN ORGANIZED HEALTH CARE EDUCATION/TRAINING PROGRAM

## 2020-10-08 PROCEDURE — 99213 OFFICE O/P EST LOW 20 MIN: CPT

## 2020-10-08 RX ORDER — LIDOCAINE HYDROCHLORIDE AND EPINEPHRINE 15; 5 MG/ML; UG/ML
INJECTION, SOLUTION EPIDURAL AS NEEDED
Status: DISCONTINUED | OUTPATIENT
Start: 2020-10-08 | End: 2020-10-09 | Stop reason: HOSPADM

## 2020-10-08 RX ORDER — CALCIUM CARBONATE 200(500)MG
1000 TABLET,CHEWABLE ORAL DAILY PRN
Status: DISCONTINUED | OUTPATIENT
Start: 2020-10-08 | End: 2020-10-09

## 2020-10-08 RX ORDER — SODIUM CHLORIDE, SODIUM LACTATE, POTASSIUM CHLORIDE, CALCIUM CHLORIDE 600; 310; 30; 20 MG/100ML; MG/100ML; MG/100ML; MG/100ML
125 INJECTION, SOLUTION INTRAVENOUS CONTINUOUS
Status: DISCONTINUED | OUTPATIENT
Start: 2020-10-08 | End: 2020-10-11 | Stop reason: HOSPADM

## 2020-10-08 RX ORDER — BUTORPHANOL TARTRATE 1 MG/ML
1 INJECTION, SOLUTION INTRAMUSCULAR; INTRAVENOUS ONCE
Status: COMPLETED | OUTPATIENT
Start: 2020-10-08 | End: 2020-10-08

## 2020-10-08 RX ORDER — LIDOCAINE HYDROCHLORIDE 10 MG/ML
INJECTION, SOLUTION EPIDURAL; INFILTRATION; INTRACAUDAL; PERINEURAL AS NEEDED
Status: DISCONTINUED | OUTPATIENT
Start: 2020-10-08 | End: 2020-10-09 | Stop reason: HOSPADM

## 2020-10-08 RX ORDER — ONDANSETRON 2 MG/ML
4 INJECTION INTRAMUSCULAR; INTRAVENOUS EVERY 8 HOURS PRN
Status: DISCONTINUED | OUTPATIENT
Start: 2020-10-08 | End: 2020-10-09

## 2020-10-08 RX ORDER — ROPIVACAINE HYDROCHLORIDE 2 MG/ML
INJECTION, SOLUTION EPIDURAL; INFILTRATION; PERINEURAL AS NEEDED
Status: DISCONTINUED | OUTPATIENT
Start: 2020-10-08 | End: 2020-10-09 | Stop reason: HOSPADM

## 2020-10-08 RX ORDER — ACETAMINOPHEN 325 MG/1
650 TABLET ORAL EVERY 4 HOURS PRN
Status: DISCONTINUED | OUTPATIENT
Start: 2020-10-08 | End: 2020-10-09

## 2020-10-08 RX ADMIN — BUTORPHANOL TARTRATE 1 MG: 1 INJECTION, SOLUTION INTRAMUSCULAR; INTRAVENOUS at 21:01

## 2020-10-08 RX ADMIN — ROPIVACAINE HYDROCHLORIDE 5 ML: 2 INJECTION, SOLUTION EPIDURAL; INFILTRATION at 23:26

## 2020-10-08 RX ADMIN — LIDOCAINE HYDROCHLORIDE AND EPINEPHRINE 3 ML: 15; 5 INJECTION, SOLUTION EPIDURAL at 23:20

## 2020-10-08 RX ADMIN — SODIUM CHLORIDE, SODIUM LACTATE, POTASSIUM CHLORIDE, AND CALCIUM CHLORIDE 999 ML/HR: .6; .31; .03; .02 INJECTION, SOLUTION INTRAVENOUS at 23:08

## 2020-10-08 RX ADMIN — ROPIVACAINE HYDROCHLORIDE 5 ML: 2 INJECTION, SOLUTION EPIDURAL; INFILTRATION at 23:22

## 2020-10-08 RX ADMIN — SODIUM CHLORIDE, SODIUM LACTATE, POTASSIUM CHLORIDE, AND CALCIUM CHLORIDE 125 ML/HR: .6; .31; .03; .02 INJECTION, SOLUTION INTRAVENOUS at 20:43

## 2020-10-08 RX ADMIN — LIDOCAINE HYDROCHLORIDE 4 ML: 10 INJECTION, SOLUTION EPIDURAL; INFILTRATION; INTRACAUDAL; PERINEURAL at 23:16

## 2020-10-08 RX ADMIN — ROPIVACAINE HYDROCHLORIDE: 2 INJECTION, SOLUTION EPIDURAL; INFILTRATION at 23:30

## 2020-10-09 PROBLEM — Z3A.39 39 WEEKS GESTATION OF PREGNANCY: Status: ACTIVE | Noted: 2020-10-08

## 2020-10-09 LAB
ATRIAL RATE: 128 BPM
BASE EXCESS BLDCOA CALC-SCNC: -3.7 MMOL/L (ref 3–11)
BASE EXCESS BLDCOV CALC-SCNC: -2.5 MMOL/L (ref 1–9)
HCO3 BLDCOA-SCNC: 23 MMOL/L (ref 17.3–27.3)
HCO3 BLDCOV-SCNC: 21.9 MMOL/L (ref 12.2–28.6)
O2 CT VFR BLDCOA CALC: 13.1 ML/DL
OXYHGB MFR BLDCOA: 55.4 %
OXYHGB MFR BLDCOV: 86.2 %
P AXIS: 60 DEGREES
PCO2 BLDCOA: 47.7 MM[HG] (ref 30–60)
PCO2 BLDCOV: 37.1 MM HG (ref 27–43)
PH BLDCOA: 7.3 [PH] (ref 7.23–7.43)
PH BLDCOV: 7.39 [PH] (ref 7.19–7.49)
PO2 BLDCOA: 23.7 MM HG (ref 5–25)
PO2 BLDCOV: 40 MM HG (ref 15–45)
PR INTERVAL: 144 MS
QRS AXIS: 64 DEGREES
QRSD INTERVAL: 66 MS
QT INTERVAL: 302 MS
QTC INTERVAL: 440 MS
RPR SER QL: NORMAL
SAO2 % BLDCOV: 20.7 ML/DL
SARS-COV-2 RNA RESP QL NAA+PROBE: NEGATIVE
T WAVE AXIS: 44 DEGREES
VENTRICULAR RATE: 128 BPM

## 2020-10-09 PROCEDURE — 93005 ELECTROCARDIOGRAM TRACING: CPT

## 2020-10-09 PROCEDURE — 82805 BLOOD GASES W/O2 SATURATION: CPT | Performed by: OBSTETRICS & GYNECOLOGY

## 2020-10-09 PROCEDURE — 93010 ELECTROCARDIOGRAM REPORT: CPT | Performed by: INTERNAL MEDICINE

## 2020-10-09 PROCEDURE — 59400 OBSTETRICAL CARE: CPT | Performed by: OBSTETRICS & GYNECOLOGY

## 2020-10-09 PROCEDURE — 88307 TISSUE EXAM BY PATHOLOGIST: CPT | Performed by: PATHOLOGY

## 2020-10-09 PROCEDURE — 4A0HXCZ MEASUREMENT OF PRODUCTS OF CONCEPTION, CARDIAC RATE, EXTERNAL APPROACH: ICD-10-PCS | Performed by: OBSTETRICS & GYNECOLOGY

## 2020-10-09 PROCEDURE — 0HQ9XZZ REPAIR PERINEUM SKIN, EXTERNAL APPROACH: ICD-10-PCS | Performed by: OBSTETRICS & GYNECOLOGY

## 2020-10-09 PROCEDURE — 87635 SARS-COV-2 COVID-19 AMP PRB: CPT | Performed by: OBSTETRICS & GYNECOLOGY

## 2020-10-09 RX ORDER — DIAPER,BRIEF,INFANT-TODD,DISP
1 EACH MISCELLANEOUS 4 TIMES DAILY PRN
Status: DISCONTINUED | OUTPATIENT
Start: 2020-10-09 | End: 2020-10-11 | Stop reason: HOSPADM

## 2020-10-09 RX ORDER — ACETAMINOPHEN 325 MG/1
650 TABLET ORAL EVERY 6 HOURS PRN
Status: DISCONTINUED | OUTPATIENT
Start: 2020-10-09 | End: 2020-10-11 | Stop reason: HOSPADM

## 2020-10-09 RX ORDER — DOCUSATE SODIUM 100 MG/1
100 CAPSULE, LIQUID FILLED ORAL 2 TIMES DAILY
Status: DISCONTINUED | OUTPATIENT
Start: 2020-10-09 | End: 2020-10-11 | Stop reason: HOSPADM

## 2020-10-09 RX ORDER — OXYTOCIN/RINGER'S LACTATE 30/500 ML
PLASTIC BAG, INJECTION (ML) INTRAVENOUS
Status: COMPLETED
Start: 2020-10-09 | End: 2020-10-09

## 2020-10-09 RX ORDER — IBUPROFEN 600 MG/1
600 TABLET ORAL EVERY 6 HOURS PRN
Status: DISCONTINUED | OUTPATIENT
Start: 2020-10-09 | End: 2020-10-11 | Stop reason: HOSPADM

## 2020-10-09 RX ORDER — METHYLERGONOVINE MALEATE 0.2 MG/ML
0.2 INJECTION INTRAVENOUS ONCE
Status: COMPLETED | OUTPATIENT
Start: 2020-10-09 | End: 2020-10-09

## 2020-10-09 RX ORDER — METHYLERGONOVINE MALEATE 0.2 MG/ML
INJECTION INTRAVENOUS
Status: COMPLETED
Start: 2020-10-09 | End: 2020-10-09

## 2020-10-09 RX ADMIN — AMPICILLIN SODIUM 2000 MG: 2 INJECTION, POWDER, FOR SOLUTION INTRAMUSCULAR; INTRAVENOUS at 12:32

## 2020-10-09 RX ADMIN — IBUPROFEN 600 MG: 600 TABLET ORAL at 16:16

## 2020-10-09 RX ADMIN — SODIUM CHLORIDE, SODIUM LACTATE, POTASSIUM CHLORIDE, AND CALCIUM CHLORIDE 999 ML/HR: .6; .31; .03; .02 INJECTION, SOLUTION INTRAVENOUS at 03:50

## 2020-10-09 RX ADMIN — SODIUM CHLORIDE, SODIUM LACTATE, POTASSIUM CHLORIDE, AND CALCIUM CHLORIDE 125 ML/HR: .6; .31; .03; .02 INJECTION, SOLUTION INTRAVENOUS at 00:04

## 2020-10-09 RX ADMIN — SODIUM CHLORIDE, SODIUM LACTATE, POTASSIUM CHLORIDE, AND CALCIUM CHLORIDE 125 ML/HR: .6; .31; .03; .02 INJECTION, SOLUTION INTRAVENOUS at 04:54

## 2020-10-09 RX ADMIN — GENTAMICIN SULFATE 270 MG: 40 INJECTION, SOLUTION INTRAMUSCULAR; INTRAVENOUS at 12:54

## 2020-10-09 RX ADMIN — METHYLERGONOVINE MALEATE 0.2 MG: 0.2 INJECTION INTRAVENOUS at 12:33

## 2020-10-09 RX ADMIN — BENZOCAINE AND LEVOMENTHOL: 200; 5 SPRAY TOPICAL at 15:24

## 2020-10-09 RX ADMIN — DOCUSATE SODIUM 100 MG: 100 CAPSULE ORAL at 18:22

## 2020-10-09 RX ADMIN — HYDROCORTISONE 1 APPLICATION: 1 CREAM TOPICAL at 15:24

## 2020-10-09 RX ADMIN — WITCH HAZEL 1 PAD: 500 SOLUTION RECTAL; TOPICAL at 15:24

## 2020-10-09 RX ADMIN — ROPIVACAINE HYDROCHLORIDE: 2 INJECTION, SOLUTION EPIDURAL; INFILTRATION at 07:08

## 2020-10-09 RX ADMIN — Medication 250 MILLI-UNITS/MIN: at 11:55

## 2020-10-09 RX ADMIN — ACETAMINOPHEN 650 MG: 325 TABLET, FILM COATED ORAL at 10:24

## 2020-10-09 RX ADMIN — ACETAMINOPHEN 650 MG: 325 TABLET, FILM COATED ORAL at 18:22

## 2020-10-09 RX ADMIN — IBUPROFEN 600 MG: 600 TABLET ORAL at 23:01

## 2020-10-09 NOTE — PLAN OF CARE
Problem: PAIN - ADULT  Goal: Verbalizes/displays adequate comfort level or baseline comfort level  Description: Interventions:  - Encourage patient to monitor pain and request assistance  - Assess pain using appropriate pain scale  - Administer analgesics based on type and severity of pain and evaluate response  - Implement non-pharmacological measures as appropriate and evaluate response  - Consider cultural and social influences on pain and pain management  - Notify physician/advanced practitioner if interventions unsuccessful or patient reports new pain  10/9/2020 1236 by Kenneth Dodd RN  Outcome: Progressing  10/9/2020 0735 by Kenneth Dodd RN  Outcome: Progressing     Problem: INFECTION - ADULT  Goal: Absence or prevention of progression during hospitalization  Description: INTERVENTIONS:  - Assess and monitor for signs and symptoms of infection  - Monitor lab/diagnostic results  - Monitor all insertion sites, i.e. indwelling lines, tubes, and drains  - Monitor endotracheal if appropriate and nasal secretions for changes in amount and color  - [place] appropriate cooling/warming therapies per order  - Administer medications as ordered  - Instruct and encourage patient and family to use good hand hygiene technique  - Identify and instruct in appropriate isolation precautions for identified infection/condition  10/9/2020 1236 by Kenneth Dodd RN  Outcome: Progressing  10/9/2020 0735 by Kenneth Dodd RN  Outcome: Progressing  Goal: Absence of fever/infection during neutropenic period  Description: INTERVENTIONS:  - Monitor WBC    10/9/2020 1236 by Kenneth Dodd RN  Outcome: Progressing  10/9/2020 0735 by Kenneth Dodd RN  Outcome: Progressing     Problem: SAFETY ADULT  Goal: Patient will remain free of falls  Description: INTERVENTIONS:  - Assess patient frequently for physical needs  -  Identify cognitive and physical deficits and behaviors that affect risk of falls.   -  [place] fall precautions as indicated by assessment.  - Educate patient/family on patient safety including physical limitations  - Instruct patient to call for assistance with activity based on assessment  - Modify environment to reduce risk of injury  - Consider OT/PT consult to assist with strengthening/mobility  10/9/2020 1236 by Flavio Miranda RN  Outcome: Progressing  10/9/2020 0735 by Flavio Miranda RN  Outcome: Progressing  Goal: Maintain or return to baseline ADL function  Description: INTERVENTIONS:  -  Assess patient's ability to carry out ADLs; assess patient's baseline for ADL function and identify physical deficits which impact ability to perform ADLs (bathing, care of mouth/teeth, toileting, grooming, dressing, etc.)  - Assess/evaluate cause of self-care deficits   - Assess range of motion  - Assess patient's mobility; develop plan if impaired  - Assess patient's need for assistive devices and provide as appropriate  - Encourage maximum independence but intervene and supervise when necessary  - Involve family in performance of ADLs  - Assess for home care needs following discharge   - Consider OT consult to assist with ADL evaluation and planning for discharge  - Provide patient education as appropriate  10/9/2020 1236 by Flavio Miranda RN  Outcome: Progressing  10/9/2020 0735 by Flavio Miranda RN  Outcome: Progressing  Goal: Maintain or return mobility status to optimal level  Description: INTERVENTIONS:  - Assess patient's baseline mobility status (ambulation, transfers, stairs, etc.)    - Identify cognitive and physical deficits and behaviors that affect mobility  - Identify mobility aids required to assist with transfers and/or ambulation (gait belt, sit-to-stand, lift, walker, cane, etc.)  - [place] fall precautions as indicated by assessment  - Record patient progress and toleration of activity level on Mobility SBAR; progress patient to next Phase/Stage  - Instruct patient to call for assistance with activity based on assessment  - Consider rehabilitation consult to assist with strengthening/weightbearing, etc.  10/9/2020 1236 by Lev Kang RN  Outcome: Progressing  10/9/2020 0735 by Lev Kang RN  Outcome: Progressing     Problem: Knowledge Deficit  Goal: Patient/family/caregiver demonstrates understanding of disease process, treatment plan, medications, and discharge instructions  Description: Complete learning assessment and assess knowledge base. Interventions:  - Provide teaching at level of understanding  - Provide teaching via preferred learning methods  10/9/2020 1236 by Lev Kang RN  Outcome: Completed  10/9/2020 0735 by Lev Kang RN  Outcome: Progressing  Goal: Verbalizes understanding of labor plan  Description: Assess patient/family/caregiver's baseline knowledge level and ability to understand information. Provide education via patient/family/caregiver's preferred learning method at appropriate level of understanding. 1. Provide teaching at level of understanding. 2. Provide teaching via preferred learning method(s).   10/9/2020 1236 by Lev Kang RN  Outcome: Completed  10/9/2020 0735 by Lev Kang RN  Outcome: Progressing     Problem: DISCHARGE PLANNING  Goal: Discharge to home or other facility with appropriate resources  Description: INTERVENTIONS:  - Identify barriers to discharge w/patient and caregiver  - Arrange for needed discharge resources and transportation as appropriate  - Identify discharge learning needs (meds, wound care, etc.)  - Arrange for interpretive services to assist at discharge as needed  - Refer to Case Management Department for coordinating discharge planning if the patient needs post-hospital services based on physician/advanced practitioner order or complex needs related to functional status, cognitive ability, or social support system  10/9/2020 1236 by Lev Kang RN  Outcome: Progressing  10/9/2020 0735 by Lev Kang RN  Outcome: Progressing     Problem: Labor & Delivery  Goal: Manages discomfort  Description: Assess and monitor for signs and symptoms of discomfort. Assess patient's pain level regularly and per hospital policy. Administer medications as ordered. Support use of nonpharmacological methods to help control pain such as distraction, imagery, relaxation, and application of heat and cold. Collaborate with interdisciplinary team and patient to determine appropriate pain management plan. 1. Include patient in decisions related to comfort. 2. Offer non-pharmacological pain management interventions. 3. Report ineffective pain management to physician. 10/9/2020 1236 by Clifton Berg RN  Outcome: Completed  10/9/2020 0735 by Clifton Berg RN  Outcome: Progressing  Goal: Patient vital signs are stable  Description: 1. Assess vital signs - vaginal delivery.   10/9/2020 1236 by Clifton Berg RN  Outcome: Completed  10/9/2020 0735 by Clifton Berg RN  Outcome: Progressing     Problem: BIRTH - VAGINAL/ SECTION  Goal: Fetal and maternal status remain reassuring during the birth process  Description: INTERVENTIONS:  - Monitor vital signs  - Monitor fetal heart rate  - Monitor uterine activity  - Monitor labor progression (vaginal delivery)  - DVT prophylaxis  - Antibiotic prophylaxis  10/9/2020 1236 by Clifton Berg RN  Outcome: Completed  10/9/2020 0735 by Clifton Berg RN  Outcome: Progressing  Goal: Emotionally satisfying birthing experience for mother/fetus  Description: Interventions:  - Assess, plan, implement and evaluate the nursing care given to the patient in labor  - Advocate the philosophy that each childbirth experience is a unique experience and support the family's chosen level of involvement and control during the labor process   - Actively participate in both the patient's and family's teaching of the birth process  - Consider cultural, Roman Catholic and age-specific factors and plan care for the patient in labor  10/9/2020 1236 by Ana Maria Sadler RN  Outcome: Completed  10/9/2020 0735 by Ana Maria Sadler RN  Outcome: Progressing     Problem: POSTPARTUM  Goal: Experiences normal postpartum course  Description: INTERVENTIONS:  - Monitor maternal vital signs  - Assess uterine involution and lochia  Outcome: Progressing  Goal: Appropriate maternal -  bonding  Description: INTERVENTIONS:  - Identify family support  - Assess for appropriate maternal/infant bonding   -Encourage maternal/infant bonding opportunities  - Referral to  or  as needed  Outcome: Progressing  Goal: Establishment of infant feeding pattern  Description: INTERVENTIONS:  - Assess breast/bottle feeding  - Refer to lactation as needed  Outcome: Progressing  Goal: Incision(s), wounds(s) or drain site(s) healing without S/S of infection  Description: INTERVENTIONS  - Assess and document risk factors for skin impairment   - Assess and document dressing, incision, wound bed, drain sites and surrounding tissue  - Consider nutrition services referral as needed  - Oral mucous membranes remain intact  - Provide patient/ family education  Outcome: Progressing     Problem: Potential for Falls  Goal: Patient will remain free of falls  Description: INTERVENTIONS:  - Assess patient frequently for physical needs  -  Identify cognitive and physical deficits and behaviors that affect risk of falls.   -  [place] fall precautions as indicated by assessment.  - Educate patient/family on patient safety including physical limitations  - Instruct patient to call for assistance with activity based on assessment  - Modify environment to reduce risk of injury  - Consider OT/PT consult to assist with strengthening/mobility  10/9/2020 1236 by Ana Maria Sadler RN  Outcome: Progressing  10/9/2020 0735 by Ana Maria Sadler RN  Outcome: Progressing

## 2020-10-09 NOTE — LACTATION NOTE
This note was copied from a baby's chart. CONSULT - LACTATION  Baby Boy (Nuvia Merritt 0 days male MRN: [id number]    [address] Room / Bed: (N)/(N) Encounter: [id number]    Maternal Information     MOTHER:  Winnie Merritt  Maternal Age: 30 y.o.   OB History: # 1 - Date: None, Sex: None, Weight: None, GA: None, Delivery: None, Apgar1: None, Apgar5: None, Living: None, Birth Comments: None   Previouse breast reduction surgery? No    Lactation history:   Has patient previously breast fed: No   How long had patient previously breast fed:     Previous breast feeding complications:       Past Surgical History:   Procedure Laterality Date   • WISDOM TOOTH EXTRACTION          Birth information:  YOB: 2018   Time of birth: 11:52 AM   Sex: male   Delivery type: Vaginal, Spontaneous   Birth Weight: 3535 g (7 lb 12.7 oz)   Percent of Weight Change: 0%     Gestational Age: 43w1d   [unfilled]    Assessment       Feeding recommendations:  breast feed on demand     Met with mother. Provided mother with Ready, Set, Baby booklet. Discussed Skin to Skin contact an benefits to mom and baby. Talked about the delay of the first bath until baby has adjusted. Spoke about the benefits of rooming in. Feeding on cue and what that means for recognizing infant's hunger. Avoidance of pacifiers for the first month discussed. Talked about exclusive breastfeeding for the first 6 months. Positioning and latch reviewed as well as showing images of other feeding positions. Discussed the properties of a good latch in any position. Reviewed hand/manual expression. Discussed s/s that baby is getting enough milk and some s/s that breastfeeding dyad may need further help. Gave information on common concerns, what to expect the first few weeks after delivery, preparing for other caregivers, and how partners can help.  Resources for support also provided. Information on hand expression given. Discussed benefits of knowing how to manually express breast including stimulating milk supply, softening nipple for latch and evacuating breast in the event of engorgement. Discussed 2nd night syndrome and ways to calm infant. Hand out given.      Rosendo Lucas RN 10/9/2020 6:58 PM

## 2020-10-09 NOTE — PLAN OF CARE
Problem: PAIN - ADULT  Goal: Verbalizes/displays adequate comfort level or baseline comfort level  Description: Interventions:  - Encourage patient to monitor pain and request assistance  - Assess pain using appropriate pain scale  - Administer analgesics based on type and severity of pain and evaluate response  - Implement non-pharmacological measures as appropriate and evaluate response  - Consider cultural and social influences on pain and pain management  - Notify physician/advanced practitioner if interventions unsuccessful or patient reports new pain  Outcome: Progressing     Problem: INFECTION - ADULT  Goal: Absence or prevention of progression during hospitalization  Description: INTERVENTIONS:  - Assess and monitor for signs and symptoms of infection  - Monitor lab/diagnostic results  - Monitor all insertion sites, i.e. indwelling lines, tubes, and drains  - Monitor endotracheal if appropriate and nasal secretions for changes in amount and color  - [place] appropriate cooling/warming therapies per order  - Administer medications as ordered  - Instruct and encourage patient and family to use good hand hygiene technique  - Identify and instruct in appropriate isolation precautions for identified infection/condition  Outcome: Progressing  Goal: Absence of fever/infection during neutropenic period  Description: INTERVENTIONS:  - Monitor WBC    Outcome: Progressing     Problem: SAFETY ADULT  Goal: Patient will remain free of falls  Description: INTERVENTIONS:  - Assess patient frequently for physical needs  -  Identify cognitive and physical deficits and behaviors that affect risk of falls.   -  [place] fall precautions as indicated by assessment.  - Educate patient/family on patient safety including physical limitations  - Instruct patient to call for assistance with activity based on assessment  - Modify environment to reduce risk of injury  - Consider OT/PT consult to assist with strengthening/mobility  Outcome: Progressing  Goal: Maintain or return to baseline ADL function  Description: INTERVENTIONS:  -  Assess patient's ability to carry out ADLs; assess patient's baseline for ADL function and identify physical deficits which impact ability to perform ADLs (bathing, care of mouth/teeth, toileting, grooming, dressing, etc.)  - Assess/evaluate cause of self-care deficits   - Assess range of motion  - Assess patient's mobility; develop plan if impaired  - Assess patient's need for assistive devices and provide as appropriate  - Encourage maximum independence but intervene and supervise when necessary  - Involve family in performance of ADLs  - Assess for home care needs following discharge   - Consider OT consult to assist with ADL evaluation and planning for discharge  - Provide patient education as appropriate  Outcome: Progressing  Goal: Maintain or return mobility status to optimal level  Description: INTERVENTIONS:  - Assess patient's baseline mobility status (ambulation, transfers, stairs, etc.)    - Identify cognitive and physical deficits and behaviors that affect mobility  - Identify mobility aids required to assist with transfers and/or ambulation (gait belt, sit-to-stand, lift, walker, cane, etc.)  - [place] fall precautions as indicated by assessment  - Record patient progress and toleration of activity level on Mobility SBAR; progress patient to next Phase/Stage  - Instruct patient to call for assistance with activity based on assessment  - Consider rehabilitation consult to assist with strengthening/weightbearing, etc.  Outcome: Progressing     Problem: Knowledge Deficit  Goal: Patient/family/caregiver demonstrates understanding of disease process, treatment plan, medications, and discharge instructions  Description: Complete learning assessment and assess knowledge base.   Interventions:  - Provide teaching at level of understanding  - Provide teaching via preferred learning methods  Outcome: Progressing  Goal: Verbalizes understanding of labor plan  Description: Assess patient/family/caregiver's baseline knowledge level and ability to understand information. Provide education via patient/family/caregiver's preferred learning method at appropriate level of understanding. 1. Provide teaching at level of understanding. 2. Provide teaching via preferred learning method(s). Outcome: Progressing     Problem: Labor & Delivery  Goal: Manages discomfort  Description: Assess and monitor for signs and symptoms of discomfort. Assess patient's pain level regularly and per hospital policy. Administer medications as ordered. Support use of nonpharmacological methods to help control pain such as distraction, imagery, relaxation, and application of heat and cold. Collaborate with interdisciplinary team and patient to determine appropriate pain management plan. 1. Include patient in decisions related to comfort. 2. Offer non-pharmacological pain management interventions. 3. Report ineffective pain management to physician. Outcome: Progressing  Goal: Patient vital signs are stable  Description: 1. Assess vital signs - vaginal delivery.   Outcome: Progressing     Problem: BIRTH - VAGINAL/ SECTION  Goal: Fetal and maternal status remain reassuring during the birth process  Description: INTERVENTIONS:  - Monitor vital signs  - Monitor fetal heart rate  - Monitor uterine activity  - Monitor labor progression (vaginal delivery)  - DVT prophylaxis  - Antibiotic prophylaxis  Outcome: Progressing  Goal: Emotionally satisfying birthing experience for mother/fetus  Description: Interventions:  - Assess, plan, implement and evaluate the nursing care given to the patient in labor  - Advocate the philosophy that each childbirth experience is a unique experience and support the family's chosen level of involvement and control during the labor process   - Actively participate in both the patient's and family's teaching of the birth process  - Consider cultural, Islam and age-specific factors and plan care for the patient in labor  Outcome: Progressing     Problem: Potential for Falls  Goal: Patient will remain free of falls  Description: INTERVENTIONS:  - Assess patient frequently for physical needs  -  Identify cognitive and physical deficits and behaviors that affect risk of falls.   -  [place] fall precautions as indicated by assessment.  - Educate patient/family on patient safety including physical limitations  - Instruct patient to call for assistance with activity based on assessment  - Modify environment to reduce risk of injury  - Consider OT/PT consult to assist with strengthening/mobility  Outcome: Progressing

## 2020-10-09 NOTE — OB LABOR/OXYTOCIN SAFETY PROGRESS
Labor Progress Note Cathie Fisher 30 y.o. female MRN: [id number]    Unit/Bed#: -01 Encounter: [id number]       Contraction Frequency (minutes): 2.5-4  Contraction Quality: Moderate  Tachysystole: No   Cervical Dilation: 4-5        Cervical Effacement: 90  Fetal Station: -1  Baseline Rate: 145 bpm  Fetal Heart Rate: 136 BPM  FHR Category: Category I               Vital Signs:   Vitals:    10/08/20 2205   BP: 133/75   Pulse: 96   Resp:    Temp:    SpO2:            Notes/comments: On recheck patient 4.5/90/-1. Patient with adequate contraction pattern, tolerating pain. Will recheck in 2 hrs or sooner if indicated.        Chuck Yap MD 10/8/2020 10:34 PM

## 2020-10-09 NOTE — L&D DELIVERY NOTE
Delivery Note    Obstetrician:   Hailey Adames    Assistant: Chester Zaldivar    Pre-Delivery Diagnosis: Term pregnancy, Spontaneous labor and Single fetus    Post-Delivery Diagnosis: Same as above - Delivered or 1st degree laceration    Procedure: Repair first degree spontaneous laceration, SAVD      Estimated Blood Loss:  QBL done           Complications:  None    Venous Ph 7.389  BE -2.5  Arterial Ph: 7.302  BE -3.7    Details: Patient delivered a viable Male  over first degree laceration. After delivery of the  and appropriate delay, the umbilical cord was doubly clamped and cut and the  was passed to staff for routine care  Baby was suctioned on perineum and again on abdomen. Umbilical cord blood and umbilical artery and venous gases were collected. Active management of the third stage of labor was undertaken with IV pitocin. Bleeding was noted to be under control. Placenta delivered intact with 3-v cord and trailing membranes. Inspection of the perineum revealed the above which was then repaired in standard fashion with 3-0 Vicryl rapid. The lacerations showed good tissue reapproximation and hemostasis. Uterus was firm with massage and methergine was given in addition to oxytocin with good tone and hemostasis at end of case. Mother and baby are currently recovering nicely in stable condition.            Attending Attestation: I was present for the entire procedure

## 2020-10-09 NOTE — OB LABOR/OXYTOCIN SAFETY PROGRESS
Labor Progress Note Letta Precise 30 y.o. female MRN: [id number]    Unit/Bed#: -01 Encounter: [id number]       Contraction Frequency (minutes): 5  Contraction Quality: Moderate  Tachysystole: No   Cervical Dilation: 7        Cervical Effacement: 90  Fetal Station: -1  Baseline Rate: 170 bpm  Fetal Heart Rate: 160 BPM  FHR Category: Category I               Vital Signs:   Vitals:    10/09/20 0511   BP: 143/82   Pulse: (!) 109   Resp:    Temp:    SpO2: 100%           Notes/comments: SVE as above. FHT category 1. Ctx every 5.         Kimani Cheek MD 10/9/2020 5:28 AM

## 2020-10-09 NOTE — OB LABOR/OXYTOCIN SAFETY PROGRESS
Labor Progress Note Michael Flores 30 y.o. female MRN: [id number]    Unit/Bed#: -01 Encounter: [id number]       Contraction Frequency (minutes): 4-4.5  Contraction Quality: Moderate  Tachysystole: No   Cervical Dilation: 6        Cervical Effacement: 90  Fetal Station: -1  Baseline Rate: 150 bpm  Fetal Heart Rate: 158 BPM  FHR Category: Category I               Vital Signs:   Vitals:    10/09/20 0352   BP:    Pulse: (!) 125   Resp:    Temp:    SpO2: 97%           Notes/comments: RN informed me patient is tachy in 120s. She is asymptomatic at this time. No chest pain, shortness of breath, dizziness. She is afebrile and otherwise vitally stable. FHT continues to be category 1. EKG was performed and demonstrated sinus tachycardia. Will give fluid bolus and continue to monitor heart rate. Physical Exam   CV: Tachy rate, regular rhythm   Pulm: Lungs CTA     Dr. Guy Aguirre aware.         Kalia Junior MD 10/9/2020 4:00 AM

## 2020-10-09 NOTE — H&P
Ben Merritt 30 y.o. female MRN: [id number]  Unit/Bed#: LD TRIAGE 2- Encounter: [id number]      Assessment: 30 y.o. Mendel Dodge at 38w1d admitted for labor. Negative nitrazine and ferning tests. SVE: /-1  FHT: 150  Clinical EFW: 6lbs  ; Vertex confirmed by US  GBS status: negative  Postpartum contraception plan: Depo after delivery, then will discuss with provider      Plan:   · Admit  · CBC, RPR, Type & Screen  · Patient also with elevated BP at 143/81, will order CMP and urine protein/creatinine ratio  · Analgesia at maternal request  · Expectant management   · Antibiotics not indicated  · Clear liquid diet    Dr. Kalli Landeros aware        SUBJECTIVE:    Chief Complaint: contractions    HPI: Erven Apgar is a 30 y.o. Mendel Dodge with an FLOR of 10/21/2020, by Last Menstrual Period at 38w1d who is being admitted for labor . She complains of uterine contractions since 10 AM, occurring every 5 minutes, has no LOF, and reports no VB. She states she has felt good FM. States she lost her mucus plug. Denies HA, visual disturbance. Pregnancy complications:   Patient Active Problem List   Diagnosis   • Vulvar itching   • Uterine fibroid complicating  care, baby not yet delivered in third trimester   • Fetal cardiac echogenic focus, antepartum   • Encounter for supervision of normal first pregnancy in third trimester   • 38 weeks gestation of pregnancy       Baby complications/comments: none    Review of Systems   Constitutional: Negative for chills and fever. Eyes: Negative for visual disturbance. Respiratory: Negative for shortness of breath. Cardiovascular: Negative for chest pain and palpitations. Gastrointestinal: Negative for abdominal pain, diarrhea, nausea and vomiting. Genitourinary: Negative for dysuria. Neurological: Negative for headaches.        OB History    Para Term  AB Living   1 0 0 0 0 0   SAB TAB Ectopic Multiple Live Births   0 0 0 0 0 # Outcome Date GA Lbr Abbe/2nd Weight Sex Delivery Anes PTL Lv   1 Current               Obstetric Comments   2020 CF negative       Past Medical History:   Diagnosis Date   • Lichen sclerosus of female genitalia        Past Surgical History:   Procedure Laterality Date   • WISDOM TOOTH EXTRACTION         Social History     Tobacco Use   • Smoking status: Never Smoker   • Smokeless tobacco: Never Used   Substance Use Topics   • Alcohol use: Not Currently     Frequency: Monthly or less     Drinks per session: 1 or 2     Binge frequency: Never     Comment: social       Allergies   Allergen Reactions   • Diphenhydramine Itching       Medications Prior to Admission   Medication   • aspirin (ECOTRIN LOW STRENGTH) 81 mg EC tablet   • docusate sodium (COLACE) 50 mg capsule   • ferrous sulfate 325 (65 Fe) mg tablet   • Prenat w/o A-[id number]-DSS-FA-DHA (PNV OB+DHA PO)   • clobetasol (TEMOVATE) 0.05 % ointment   • Witch Hazel (PREPARATION H) 50 % PADS           OBJECTIVE:  Vitals:  Temp:  [98.1 °F (36.7 °C)] 98.1 °F (36.7 °C)  HR:  [108] 108  Resp:  [18] 18  BP: (143)/(91) 143/91  Body mass index is 37.25 kg/m².      Physical Exam:  OBGyn Exam     SVE:    4/90/-1    FHT:  Baseline Rate: 150 bpm  Variability: Moderate 6-25 bpm  Accelerations: 15 x 15 or greater  Decelerations: None  FHR Category: Category I    TOCO:   Contraction Frequency (minutes): 3-4  Contraction Duration (seconds): 60-70  Contraction Quality: Moderate    Lab Results   Component Value Date    WBC 5.39 03/17/2020    HGB 11.2 (L) 03/17/2020    HCT 35.8 03/17/2020     03/17/2020     Lab Results   Component Value Date    K 3.7 02/13/2020     02/13/2020    CO2 24 02/13/2020    BUN 9 02/13/2020    CREATININE 0.57 (L) 02/13/2020    AST 11 02/13/2020    ALT 16 02/13/2020       Prenatal Labs: Reviewed      Blood type: A+  Antibody: negative  Group B strep: negative  HIV: negative  Hepatitis B: negative  RPR: non-reactive  Rubella: Immune  Varicella: Unknown  1 hour Glucose: 130  Platelets: 146  Hgb: 12.3    >2 Midnights  INPATIENT       Mariajose Mayorga MD  OB/GYN PGY-3  10/8/2020  8:51 PM

## 2020-10-09 NOTE — OB LABOR/OXYTOCIN SAFETY PROGRESS
Labor Progress Note Tess Berumen 30 y.o. female MRN: [id number]    Unit/Bed#: -01 Encounter: [id number]       Contraction Frequency (minutes): 4-5  Contraction Quality: Strong  Tachysystole: No   Cervical Dilation: Lip/rim (Comment)        Cervical Effacement: 100  Fetal Station: 0  Baseline Rate: 165 bpm  Fetal Heart Rate: 155 BPM  FHR Category: Category I               Vital Signs:   Vitals:    10/09/20 1007   BP:    Pulse: (!) 109   Resp:    Temp:    SpO2: 100%           Notes/comments:    Patient more comfortable. Mild temp to 100 will give tylenol and keep positioning. Continue to monitor and manage.     Devyn Swain MD 10/9/2020 10:24 AM

## 2020-10-09 NOTE — OB LABOR/OXYTOCIN SAFETY PROGRESS
Labor Progress Note [place] 30 y.o. female MRN: [id number]    Unit/Bed#: -01 Encounter: [id number]       Contraction Frequency (minutes): 2-5  Contraction Quality: Moderate  Tachysystole: No   Cervical Dilation: 5        Cervical Effacement: 90  Fetal Station: -1  Baseline Rate: 150 bpm  Fetal Heart Rate: 141 BPM  FHR Category: Category I               Vital Signs:   Vitals:    10/09/20 0015   BP: 115/59   Pulse:    Resp:    Temp:    SpO2:            Notes/comments: SVE as above. Patient comfortable with epidural. Ctx every 2-5 minutes. FHT category 1.         Valentin Crow MD 10/9/2020 12:32 AM

## 2020-10-09 NOTE — OB LABOR/OXYTOCIN SAFETY PROGRESS
Labor Progress Note Michael Dunlap 30 y.o. female MRN: [id number]    Unit/Bed#: -01 Encounter: [id number]       Contraction Frequency (minutes): 2-6  Contraction Quality: Moderate  Tachysystole: No   Cervical Dilation: 6        Cervical Effacement: 90  Fetal Station: -1  Baseline Rate: 155 bpm  Fetal Heart Rate: 168 BPM  FHR Category: Category I               Vital Signs:   Vitals:    10/09/20 0215   BP: 107/61   Pulse: (!) 117   Resp: 16   Temp: 99 °F (37.2 °C)   SpO2:            Notes/comments: SVE as above. Tracing category 1. Ctx every 3-5.         Kayli Gomez MD 10/9/2020 3:19 AM

## 2020-10-09 NOTE — PLAN OF CARE
Problem: PAIN - ADULT  Goal: Verbalizes/displays adequate comfort level or baseline comfort level  Description: Interventions:  - Encourage patient to monitor pain and request assistance  - Assess pain using appropriate pain scale  - Administer analgesics based on type and severity of pain and evaluate response  - Implement non-pharmacological measures as appropriate and evaluate response  - Consider cultural and social influences on pain and pain management  - Notify physician/advanced practitioner if interventions unsuccessful or patient reports new pain  Outcome: Progressing     Problem: INFECTION - ADULT  Goal: Absence or prevention of progression during hospitalization  Description: INTERVENTIONS:  - Assess and monitor for signs and symptoms of infection  - Monitor lab/diagnostic results  - Monitor all insertion sites, i.e. indwelling lines, tubes, and drains  - Monitor endotracheal if appropriate and nasal secretions for changes in amount and color  - [place] appropriate cooling/warming therapies per order  - Administer medications as ordered  - Instruct and encourage patient and family to use good hand hygiene technique  - Identify and instruct in appropriate isolation precautions for identified infection/condition  Outcome: Progressing  Goal: Absence of fever/infection during neutropenic period  Description: INTERVENTIONS:  - Monitor WBC    Outcome: Progressing     Problem: SAFETY ADULT  Goal: Patient will remain free of falls  Description: INTERVENTIONS:  - Assess patient frequently for physical needs  -  Identify cognitive and physical deficits and behaviors that affect risk of falls.   -  [place] fall precautions as indicated by assessment.  - Educate patient/family on patient safety including physical limitations  - Instruct patient to call for assistance with activity based on assessment  - Modify environment to reduce risk of injury  - Consider OT/PT consult to assist with strengthening/mobility  Outcome: Progressing  Goal: Maintain or return to baseline ADL function  Description: INTERVENTIONS:  -  Assess patient's ability to carry out ADLs; assess patient's baseline for ADL function and identify physical deficits which impact ability to perform ADLs (bathing, care of mouth/teeth, toileting, grooming, dressing, etc.)  - Assess/evaluate cause of self-care deficits   - Assess range of motion  - Assess patient's mobility; develop plan if impaired  - Assess patient's need for assistive devices and provide as appropriate  - Encourage maximum independence but intervene and supervise when necessary  - Involve family in performance of ADLs  - Assess for home care needs following discharge   - Consider OT consult to assist with ADL evaluation and planning for discharge  - Provide patient education as appropriate  Outcome: Progressing  Goal: Maintain or return mobility status to optimal level  Description: INTERVENTIONS:  - Assess patient's baseline mobility status (ambulation, transfers, stairs, etc.)    - Identify cognitive and physical deficits and behaviors that affect mobility  - Identify mobility aids required to assist with transfers and/or ambulation (gait belt, sit-to-stand, lift, walker, cane, etc.)  - [place] fall precautions as indicated by assessment  - Record patient progress and toleration of activity level on Mobility SBAR; progress patient to next Phase/Stage  - Instruct patient to call for assistance with activity based on assessment  - Consider rehabilitation consult to assist with strengthening/weightbearing, etc.  Outcome: Progressing     Problem: Knowledge Deficit  Goal: Patient/family/caregiver demonstrates understanding of disease process, treatment plan, medications, and discharge instructions  Description: Complete learning assessment and assess knowledge base.   Interventions:  - Provide teaching at level of understanding  - Provide teaching via preferred learning methods  Outcome: Progressing  Goal: Verbalizes understanding of labor plan  Description: Assess patient/family/caregiver's baseline knowledge level and ability to understand information. Provide education via patient/family/caregiver's preferred learning method at appropriate level of understanding. 1. Provide teaching at level of understanding. 2. Provide teaching via preferred learning method(s). Outcome: Progressing     Problem: DISCHARGE PLANNING  Goal: Discharge to home or other facility with appropriate resources  Description: INTERVENTIONS:  - Identify barriers to discharge w/patient and caregiver  - Arrange for needed discharge resources and transportation as appropriate  - Identify discharge learning needs (meds, wound care, etc.)  - Arrange for interpretive services to assist at discharge as needed  - Refer to Case Management Department for coordinating discharge planning if the patient needs post-hospital services based on physician/advanced practitioner order or complex needs related to functional status, cognitive ability, or social support system  Outcome: Progressing     Problem: Labor & Delivery  Goal: Manages discomfort  Description: Assess and monitor for signs and symptoms of discomfort. Assess patient's pain level regularly and per hospital policy. Administer medications as ordered. Support use of nonpharmacological methods to help control pain such as distraction, imagery, relaxation, and application of heat and cold. Collaborate with interdisciplinary team and patient to determine appropriate pain management plan. 1. Include patient in decisions related to comfort. 2. Offer non-pharmacological pain management interventions. 3. Report ineffective pain management to physician. Outcome: Progressing  Goal: Patient vital signs are stable  Description: 1. Assess vital signs - vaginal delivery.   Outcome: Progressing     Problem: BIRTH - VAGINAL/ SECTION  Goal: Fetal and maternal status remain reassuring during the birth process  Description: INTERVENTIONS:  - Monitor vital signs  - Monitor fetal heart rate  - Monitor uterine activity  - Monitor labor progression (vaginal delivery)  - DVT prophylaxis  - Antibiotic prophylaxis  Outcome: Progressing  Goal: Emotionally satisfying birthing experience for mother/fetus  Description: Interventions:  - Assess, plan, implement and evaluate the nursing care given to the patient in labor  - Advocate the philosophy that each childbirth experience is a unique experience and support the family's chosen level of involvement and control during the labor process   - Actively participate in both the patient's and family's teaching of the birth process  - Consider cultural, [place] and age-specific factors and plan care for the patient in labor  Outcome: Progressing     Problem: POSTPARTUM  Goal: Experiences normal postpartum course  Description: INTERVENTIONS:  - Monitor maternal vital signs  - Assess uterine involution and lochia  Outcome: Progressing  Goal: Appropriate maternal -  bonding  Description: INTERVENTIONS:  - Identify family support  - Assess for appropriate maternal/infant bonding   -Encourage maternal/infant bonding opportunities  - Referral to  or  as needed  Outcome: Progressing  Goal: Establishment of infant feeding pattern  Description: INTERVENTIONS:  - Assess breast/bottle feeding  - Refer to lactation as needed  Outcome: Progressing  Goal: Incision(s), wounds(s) or drain site(s) healing without S/S of infection  Description: INTERVENTIONS  - Assess and document risk factors for skin impairment   - Assess and document dressing, incision, wound bed, drain sites and surrounding tissue  - Consider nutrition services referral as needed  - Oral mucous membranes remain intact  - Provide patient/ family education  Outcome: Progressing

## 2020-10-09 NOTE — OB LABOR/OXYTOCIN SAFETY PROGRESS
Labor Progress Note - [facility] Aleksandra Merritt 30 y.o. female MRN: [id number]    Unit/Bed#: -01 Encounter: [id number]       Contraction Frequency (minutes): 4-5  Contraction Quality: Strong  Tachysystole: No   Cervical Dilation: 9        Cervical Effacement: 90  Fetal Station: -1  Baseline Rate: 150 bpm  Fetal Heart Rate: 155 BPM  FHR Category: Category I               Vital Signs:   Vitals:    10/09/20 0800   BP: 147/81   Pulse: (!) 116   Resp:    Temp:    SpO2: 100%           Notes/comments:    Patient feeling pressure in back, has been redosed. Will continue to monitor and manage. No palpable membranes suspect srom.     Azul Leo MD 10/9/2020 8:21 AM

## 2020-10-10 PROCEDURE — 99024 POSTOP FOLLOW-UP VISIT: CPT | Performed by: OBSTETRICS & GYNECOLOGY

## 2020-10-10 RX ADMIN — DOCUSATE SODIUM 100 MG: 100 CAPSULE ORAL at 08:15

## 2020-10-10 RX ADMIN — DOCUSATE SODIUM 100 MG: 100 CAPSULE ORAL at 17:26

## 2020-10-10 RX ADMIN — IBUPROFEN 600 MG: 600 TABLET ORAL at 04:17

## 2020-10-10 RX ADMIN — IBUPROFEN 600 MG: 600 TABLET ORAL at 18:37

## 2020-10-10 RX ADMIN — ACETAMINOPHEN 650 MG: 325 TABLET, FILM COATED ORAL at 08:21

## 2020-10-10 NOTE — PLAN OF CARE
Problem: PAIN - ADULT  Goal: Verbalizes/displays adequate comfort level or baseline comfort level  Description: Interventions:  - Encourage patient to monitor pain and request assistance  - Assess pain using appropriate pain scale  - Administer analgesics based on type and severity of pain and evaluate response  - Implement non-pharmacological measures as appropriate and evaluate response  - Consider cultural and social influences on pain and pain management  - Notify physician/advanced practitioner if interventions unsuccessful or patient reports new pain  Outcome: Progressing     Problem: INFECTION - ADULT  Goal: Absence or prevention of progression during hospitalization  Description: INTERVENTIONS:  - Assess and monitor for signs and symptoms of infection  - Monitor lab/diagnostic results  - Monitor all insertion sites, i.e. indwelling lines, tubes, and drains  - Monitor endotracheal if appropriate and nasal secretions for changes in amount and color  - [place] appropriate cooling/warming therapies per order  - Administer medications as ordered  - Instruct and encourage patient and family to use good hand hygiene technique  - Identify and instruct in appropriate isolation precautions for identified infection/condition  Outcome: Progressing  Goal: Absence of fever/infection during neutropenic period  Description: INTERVENTIONS:  - Monitor WBC    Outcome: Progressing     Problem: SAFETY ADULT  Goal: Patient will remain free of falls  Description: INTERVENTIONS:  - Assess patient frequently for physical needs  -  Identify cognitive and physical deficits and behaviors that affect risk of falls.   -  [place] fall precautions as indicated by assessment.  - Educate patient/family on patient safety including physical limitations  - Instruct patient to call for assistance with activity based on assessment  - Modify environment to reduce risk of injury  - Consider OT/PT consult to assist with strengthening/mobility  Outcome: Progressing  Goal: Maintain or return to baseline ADL function  Description: INTERVENTIONS:  -  Assess patient's ability to carry out ADLs; assess patient's baseline for ADL function and identify physical deficits which impact ability to perform ADLs (bathing, care of mouth/teeth, toileting, grooming, dressing, etc.)  - Assess/evaluate cause of self-care deficits   - Assess range of motion  - Assess patient's mobility; develop plan if impaired  - Assess patient's need for assistive devices and provide as appropriate  - Encourage maximum independence but intervene and supervise when necessary  - Involve family in performance of ADLs  - Assess for home care needs following discharge   - Consider OT consult to assist with ADL evaluation and planning for discharge  - Provide patient education as appropriate  Outcome: Progressing  Goal: Maintain or return mobility status to optimal level  Description: INTERVENTIONS:  - Assess patient's baseline mobility status (ambulation, transfers, stairs, etc.)    - Identify cognitive and physical deficits and behaviors that affect mobility  - Identify mobility aids required to assist with transfers and/or ambulation (gait belt, sit-to-stand, lift, walker, cane, etc.)  - [place] fall precautions as indicated by assessment  - Record patient progress and toleration of activity level on Mobility SBAR; progress patient to next Phase/Stage  - Instruct patient to call for assistance with activity based on assessment  - Consider rehabilitation consult to assist with strengthening/weightbearing, etc.  Outcome: Progressing     Problem: DISCHARGE PLANNING  Goal: Discharge to home or other facility with appropriate resources  Description: INTERVENTIONS:  - Identify barriers to discharge w/patient and caregiver  - Arrange for needed discharge resources and transportation as appropriate  - Identify discharge learning needs (meds, wound care, etc.)  - Arrange for interpretive services to assist at discharge as needed  - Refer to Case Management Department for coordinating discharge planning if the patient needs post-hospital services based on physician/advanced practitioner order or complex needs related to functional status, cognitive ability, or social support system  Outcome: Progressing     Problem: POSTPARTUM  Goal: Experiences normal postpartum course  Description: INTERVENTIONS:  - Monitor maternal vital signs  - Assess uterine involution and lochia  Outcome: Progressing  Goal: Appropriate maternal -  bonding  Description: INTERVENTIONS:  - Identify family support  - Assess for appropriate maternal/infant bonding   -Encourage maternal/infant bonding opportunities  - Referral to  or  as needed  Outcome: Progressing  Goal: Establishment of infant feeding pattern  Description: INTERVENTIONS:  - Assess breast/bottle feeding  - Refer to lactation as needed  Outcome: Progressing  Goal: Incision(s), wounds(s) or drain site(s) healing without S/S of infection  Description: INTERVENTIONS  - Assess and document risk factors for skin impairment   - Assess and document dressing, incision, wound bed, drain sites and surrounding tissue  - Consider nutrition services referral as needed  - Oral mucous membranes remain intact  - Provide patient/ family education  Outcome: Progressing     Problem: Potential for Falls  Goal: Patient will remain free of falls  Description: INTERVENTIONS:  - Assess patient frequently for physical needs  -  Identify cognitive and physical deficits and behaviors that affect risk of falls.   -  [place] fall precautions as indicated by assessment.  - Educate patient/family on patient safety including physical limitations  - Instruct patient to call for assistance with activity based on assessment  - Modify environment to reduce risk of injury  - Consider OT/PT consult to assist with strengthening/mobility  Outcome: Progressing

## 2020-10-10 NOTE — PROGRESS NOTES
POSTPARTUM NOTE  Najma Wyatt 30 y.o. female MRN: [id number]  Unit/Bed#: -01 Encounter: [id number]    Date: 10/10/20  Procedure: Spontaneous Vaginal Delivery  Postpartum/Postop Day #: 1     SUBJECTIVE:  Pain: yes, perineum and lower abdomen, worse with movement  Tolerating Oral Intake: yes   Voiding: yes  Flatus: no  Bowel Movement: no  Ambulating: yes  Breastfeeding: yes  Chest Pain: no  Shortness of Breath: no  Leg Pain/Discomfort: no  Lochia: moderate  Other: No other concerns.     OBJECTIVE:   Vitals: Temp:  [97.8 °F (36.6 °C)-100.4 °F (38 °C)] 97.8 °F (36.6 °C)  HR:  [] 96  Resp:  [18] 18  BP: (120-168)/(58-92) 125/79  General: No Acute Distress   Cardiovascular: Regular, Rate and Rhythm, no murmur, normal S1/S2   Lungs: Clear to Auscultation Bilaterally, no wheezing, non-labored breathing   Abdomen: Soft, non-distended, mildly tender, no rebound or guarding  Fundus: Firm & mildly tender, Fundal Location: +2 cm above the umbilicus  Lower Extremities: Non-tender, Edema Minimal    LABS / TESTS / MEDICATION:    Recent Results (from the past 72 hour(s))   Comprehensive metabolic panel    Collection Time: 10/08/20  8:41 PM   Result Value Ref Range    Sodium 135 (L) 136 - 145 mmol/L    Potassium 3.7 3.5 - 5.3 mmol/L    Chloride 104 100 - 108 mmol/L    CO2 22 21 - 32 mmol/L    ANION GAP 9 4 - 13 mmol/L    BUN 6 5 - 25 mg/dL    Creatinine 0.46 (L) 0.60 - 1.30 mg/dL    Glucose 94 65 - 140 mg/dL    Calcium 8.8 8.3 - 10.1 mg/dL    Corrected Calcium 9.9 8.3 - 10.1 mg/dL    AST 19 5 - 45 U/L    ALT 25 12 - 78 U/L    Alkaline Phosphatase 146 (H) 46 - 116 U/L    Total Protein 7.0 6.4 - 8.2 g/dL    Albumin 2.6 (L) 3.5 - 5.0 g/dL    Total Bilirubin 0.19 (L) 0.20 - 1.00 mg/dL    eGFR 132 ml/min/1.73sq m   CBC and Platelet    Collection Time: 10/08/20  8:41 PM   Result Value Ref Range    WBC 9.21 4.31 - 10.16 Thousand/uL    RBC 4.50 3.81 - 5.12 Million/uL    Hemoglobin 13.5 11.5 - 15.4 g/dL    Hematocrit 40.0 34.8 - 46.1 %    MCV 89 82 - 98 fL    MCH 30.0 26.8 - 34.3 pg    MCHC 33.8 31.4 - 37.4 g/dL    RDW 13.3 11.6 - 15.1 %    Platelets 580 383 - 260 Thousands/uL    MPV 9.4 8.9 - 12.7 fL   RPR    Collection Time: 10/08/20  8:41 PM   Result Value Ref Range    RPR Non-Reactive Non-Reactive   Type and screen    Collection Time: 10/08/20  8:41 PM   Result Value Ref Range    ABO Grouping A     Rh Factor Positive     Antibody Screen Negative     Specimen Expiration Date 20201011    Protein / creatinine ratio, urine    Collection Time: 10/08/20  8:42 PM   Result Value Ref Range    Creatinine, Ur 66.0 mg/dL    Protein Urine Random 33 mg/dL    Prot/Creat Ratio, Ur 0.50 (H) 0.00 - 0.10   ECG 12 lead    Collection Time: 10/09/20  3:51 AM   Result Value Ref Range    Ventricular Rate 128 BPM    Atrial Rate 128 BPM    AR Interval 144 ms    QRSD Interval 66 ms    QT Interval 302 ms    QTC Interval 440 ms    P Axis 60 degrees    QRS Axis 64 degrees    T Wave Axis 44 degrees   Novel Coronavirus (Covid-19),PCR [facility]    Collection Time: 10/09/20 11:31 AM    Specimen: Nose; Nares   Result Value Ref Range    SARS-CoV-2 Negative Negative   Blood gas, arterial, cord    Collection Time: 10/09/20 12:02 PM   Result Value Ref Range    pH, Cord Art 7.302 7.230 - 7.430    pCO2, Cord Art 47.7 30.0 - 60.0    pO2, Cord Art 23.7 5.0 - 25.0 mm HG    HCO3, Cord Art 23.0 17.3 - 27.3 mmol/L    Base Exc, Cord Art -3.7 (L) 3.0 - 11.0 mmol/L    O2 Content, Cord Art 13.1 ml/dl    O2 Hgb, Arterial Cord 55.4 %   Blood gas, venous, cord    Collection Time: 10/09/20 12:02 PM   Result Value Ref Range    pH, Cord [place] 7.389 7.190 - 7.490    pCO2, Cord [place] 37.1 27.0 - 43.0 mm HG    pO2, Cord [place] 40.0 15.0 - 45.0 mm HG    HCO3, Cord [place] 21.9 12.2 - 28.6 mmol/L    Base Exc, Cord [place] -2.5 (L) 1.0 - 9.0 mmol/L    O2 Cont, Cord [place] 20.7 mL/dL    O2 HGB,VENOUS CORD 86.2 %       docusate sodium, 100 mg, Oral, BID      acetaminophen, 650 mg, Q6H PRN  benzocaine-menthol-lanolin-aloe, , 4x Daily PRN  hydrocortisone, 1 application, 4x Daily PRN  ibuprofen, 600 mg, Q6H PRN  witch hazel-glycerin, 1 pad, Q2H PRN        ASSESSMENT:   30 y.o. Niya Coppersmith s/p Spontaneous Vaginal Delivery Postpartum day  1 s/p amp/gent for possible chorio. No fevers since 100.8F temp at time of delivery. COVID-19 negative.   PLAN:  1) Delivery: Continue routine postpartum care, encourage ambulation, advance diet as tolerated  2)Pain: continue Tylenol/ibuprofen  3)Bowel function: continue Colace, reassess for flatus/BMs    Signature / Title: Helene Tamez MD, Family Medicine  Date: 10/10/2020  Time: 7:19 AM

## 2020-10-10 NOTE — PLAN OF CARE
Problem: PAIN - ADULT  Goal: Verbalizes/displays adequate comfort level or baseline comfort level  Description: Interventions:  - Encourage patient to monitor pain and request assistance  - Assess pain using appropriate pain scale  - Administer analgesics based on type and severity of pain and evaluate response  - Implement non-pharmacological measures as appropriate and evaluate response  - Consider cultural and social influences on pain and pain management  - Notify physician/advanced practitioner if interventions unsuccessful or patient reports new pain  Outcome: Progressing     Problem: INFECTION - ADULT  Goal: Absence or prevention of progression during hospitalization  Description: INTERVENTIONS:  - Assess and monitor for signs and symptoms of infection  - Monitor lab/diagnostic results  - Monitor all insertion sites, i.e. indwelling lines, tubes, and drains  - Monitor endotracheal if appropriate and nasal secretions for changes in amount and color  - [place] appropriate cooling/warming therapies per order  - Administer medications as ordered  - Instruct and encourage patient and family to use good hand hygiene technique  - Identify and instruct in appropriate isolation precautions for identified infection/condition  Outcome: Progressing  Goal: Absence of fever/infection during neutropenic period  Description: INTERVENTIONS:  - Monitor WBC    Outcome: Progressing     Problem: SAFETY ADULT  Goal: Patient will remain free of falls  Description: INTERVENTIONS:  - Assess patient frequently for physical needs  -  Identify cognitive and physical deficits and behaviors that affect risk of falls.   -  [place] fall precautions as indicated by assessment.  - Educate patient/family on patient safety including physical limitations  - Instruct patient to call for assistance with activity based on assessment  - Modify environment to reduce risk of injury  - Consider OT/PT consult to assist with strengthening/mobility  Outcome: Progressing  Goal: Maintain or return to baseline ADL function  Description: INTERVENTIONS:  -  Assess patient's ability to carry out ADLs; assess patient's baseline for ADL function and identify physical deficits which impact ability to perform ADLs (bathing, care of mouth/teeth, toileting, grooming, dressing, etc.)  - Assess/evaluate cause of self-care deficits   - Assess range of motion  - Assess patient's mobility; develop plan if impaired  - Assess patient's need for assistive devices and provide as appropriate  - Encourage maximum independence but intervene and supervise when necessary  - Involve family in performance of ADLs  - Assess for home care needs following discharge   - Consider OT consult to assist with ADL evaluation and planning for discharge  - Provide patient education as appropriate  Outcome: Progressing  Goal: Maintain or return mobility status to optimal level  Description: INTERVENTIONS:  - Assess patient's baseline mobility status (ambulation, transfers, stairs, etc.)    - Identify cognitive and physical deficits and behaviors that affect mobility  - Identify mobility aids required to assist with transfers and/or ambulation (gait belt, sit-to-stand, lift, walker, cane, etc.)  - [place] fall precautions as indicated by assessment  - Record patient progress and toleration of activity level on Mobility SBAR; progress patient to next Phase/Stage  - Instruct patient to call for assistance with activity based on assessment  - Consider rehabilitation consult to assist with strengthening/weightbearing, etc.  Outcome: Progressing     Problem: DISCHARGE PLANNING  Goal: Discharge to home or other facility with appropriate resources  Description: INTERVENTIONS:  - Identify barriers to discharge w/patient and caregiver  - Arrange for needed discharge resources and transportation as appropriate  - Identify discharge learning needs (meds, wound care, etc.)  - Arrange for interpretive services to assist at discharge as needed  - Refer to Case Management Department for coordinating discharge planning if the patient needs post-hospital services based on physician/advanced practitioner order or complex needs related to functional status, cognitive ability, or social support system  Outcome: Progressing     Problem: POSTPARTUM  Goal: Experiences normal postpartum course  Description: INTERVENTIONS:  - Monitor maternal vital signs  - Assess uterine involution and lochia  Outcome: Progressing  Goal: Appropriate maternal -  bonding  Description: INTERVENTIONS:  - Identify family support  - Assess for appropriate maternal/infant bonding   -Encourage maternal/infant bonding opportunities  - Referral to  or  as needed  Outcome: Progressing  Goal: Establishment of infant feeding pattern  Description: INTERVENTIONS:  - Assess breast/bottle feeding  - Refer to lactation as needed  Outcome: Progressing  Goal: Incision(s), wounds(s) or drain site(s) healing without S/S of infection  Description: INTERVENTIONS  - Assess and document risk factors for skin impairment   - Assess and document dressing, incision, wound bed, drain sites and surrounding tissue  - Consider nutrition services referral as needed  - Oral mucous membranes remain intact  - Provide patient/ family education  Outcome: Progressing     Problem: Potential for Falls  Goal: Patient will remain free of falls  Description: INTERVENTIONS:  - Assess patient frequently for physical needs  -  Identify cognitive and physical deficits and behaviors that affect risk of falls.   -  [place] fall precautions as indicated by assessment.  - Educate patient/family on patient safety including physical limitations  - Instruct patient to call for assistance with activity based on assessment  - Modify environment to reduce risk of injury  - Consider OT/PT consult to assist with strengthening/mobility  Outcome: Progressing

## 2020-10-10 NOTE — LACTATION NOTE
This note was copied from a baby's chart. Mom states infant is feeding well but C/O painful on right side the whole time. Reviewed sore nipple care and given Lanolin Cream. To call for latch assment at next feeding.

## 2020-10-11 VITALS
SYSTOLIC BLOOD PRESSURE: 121 MMHG | OXYGEN SATURATION: 99 % | BODY MASS INDEX: 37.05 KG/M2 | HEART RATE: 99 BPM | HEIGHT: 64 IN | RESPIRATION RATE: 18 BRPM | WEIGHT: 217 LBS | DIASTOLIC BLOOD PRESSURE: 89 MMHG | TEMPERATURE: 98 F

## 2020-10-11 PROCEDURE — 99024 POSTOP FOLLOW-UP VISIT: CPT | Performed by: OBSTETRICS & GYNECOLOGY

## 2020-10-11 RX ORDER — IBUPROFEN 600 MG/1
600 TABLET ORAL EVERY 6 HOURS PRN
Qty: 30 TABLET | Refills: 0 | Status: SHIPPED | OUTPATIENT
Start: 2020-10-11 | End: 2021-04-06

## 2020-10-11 RX ORDER — ACETAMINOPHEN 325 MG/1
650 TABLET ORAL EVERY 6 HOURS PRN
Qty: 60 TABLET | Refills: 0 | Status: SHIPPED | OUTPATIENT
Start: 2020-10-11 | End: 2021-04-06

## 2020-10-11 RX ADMIN — IBUPROFEN 600 MG: 600 TABLET ORAL at 08:42

## 2020-10-11 RX ADMIN — DOCUSATE SODIUM 100 MG: 100 CAPSULE ORAL at 08:42

## 2020-10-11 RX ADMIN — IBUPROFEN 600 MG: 600 TABLET ORAL at 02:36

## 2020-10-11 NOTE — PLAN OF CARE
Problem: PAIN - ADULT  Goal: Verbalizes/displays adequate comfort level or baseline comfort level  Description: Interventions:  - Encourage patient to monitor pain and request assistance  - Assess pain using appropriate pain scale  - Administer analgesics based on type and severity of pain and evaluate response  - Implement non-pharmacological measures as appropriate and evaluate response  - Consider cultural and social influences on pain and pain management  - Notify physician/advanced practitioner if interventions unsuccessful or patient reports new pain  Outcome: Adequate for Discharge     Problem: INFECTION - ADULT  Goal: Absence or prevention of progression during hospitalization  Description: INTERVENTIONS:  - Assess and monitor for signs and symptoms of infection  - Monitor lab/diagnostic results  - Monitor all insertion sites, i.e. indwelling lines, tubes, and drains  - Monitor endotracheal if appropriate and nasal secretions for changes in amount and color  - [place] appropriate cooling/warming therapies per order  - Administer medications as ordered  - Instruct and encourage patient and family to use good hand hygiene technique  - Identify and instruct in appropriate isolation precautions for identified infection/condition  Outcome: Adequate for Discharge  Goal: Absence of fever/infection during neutropenic period  Description: INTERVENTIONS:  - Monitor WBC    Outcome: Adequate for Discharge     Problem: SAFETY ADULT  Goal: Patient will remain free of falls  Description: INTERVENTIONS:  - Assess patient frequently for physical needs  -  Identify cognitive and physical deficits and behaviors that affect risk of falls.   -  [place] fall precautions as indicated by assessment.  - Educate patient/family on patient safety including physical limitations  - Instruct patient to call for assistance with activity based on assessment  - Modify environment to reduce risk of injury  - Consider OT/PT consult to assist with strengthening/mobility  Outcome: Adequate for Discharge  Goal: Maintain or return to baseline ADL function  Description: INTERVENTIONS:  -  Assess patient's ability to carry out ADLs; assess patient's baseline for ADL function and identify physical deficits which impact ability to perform ADLs (bathing, care of mouth/teeth, toileting, grooming, dressing, etc.)  - Assess/evaluate cause of self-care deficits   - Assess range of motion  - Assess patient's mobility; develop plan if impaired  - Assess patient's need for assistive devices and provide as appropriate  - Encourage maximum independence but intervene and supervise when necessary  - Involve family in performance of ADLs  - Assess for home care needs following discharge   - Consider OT consult to assist with ADL evaluation and planning for discharge  - Provide patient education as appropriate  Outcome: Adequate for Discharge  Goal: Maintain or return mobility status to optimal level  Description: INTERVENTIONS:  - Assess patient's baseline mobility status (ambulation, transfers, stairs, etc.)    - Identify cognitive and physical deficits and behaviors that affect mobility  - Identify mobility aids required to assist with transfers and/or ambulation (gait belt, sit-to-stand, lift, walker, cane, etc.)  - [place] fall precautions as indicated by assessment  - Record patient progress and toleration of activity level on Mobility SBAR; progress patient to next Phase/Stage  - Instruct patient to call for assistance with activity based on assessment  - Consider rehabilitation consult to assist with strengthening/weightbearing, etc.  Outcome: Adequate for Discharge     Problem: DISCHARGE PLANNING  Goal: Discharge to home or other facility with appropriate resources  Description: INTERVENTIONS:  - Identify barriers to discharge w/patient and caregiver  - Arrange for needed discharge resources and transportation as appropriate  - Identify discharge learning needs (meds, wound care, etc.)  - Arrange for interpretive services to assist at discharge as needed  - Refer to Case Management Department for coordinating discharge planning if the patient needs post-hospital services based on physician/advanced practitioner order or complex needs related to functional status, cognitive ability, or social support system  Outcome: Adequate for Discharge     Problem: POSTPARTUM  Goal: Experiences normal postpartum course  Description: INTERVENTIONS:  - Monitor maternal vital signs  - Assess uterine involution and lochia  Outcome: Adequate for Discharge  Goal: Appropriate maternal -  bonding  Description: INTERVENTIONS:  - Identify family support  - Assess for appropriate maternal/infant bonding   -Encourage maternal/infant bonding opportunities  - Referral to  or  as needed  Outcome: Adequate for Discharge  Goal: Establishment of infant feeding pattern  Description: INTERVENTIONS:  - Assess breast/bottle feeding  - Refer to lactation as needed  Outcome: Adequate for Discharge  Goal: Incision(s), wounds(s) or drain site(s) healing without S/S of infection  Description: INTERVENTIONS  - Assess and document risk factors for skin impairment   - Assess and document dressing, incision, wound bed, drain sites and surrounding tissue  - Consider nutrition services referral as needed  - Oral mucous membranes remain intact  - Provide patient/ family education  Outcome: Adequate for Discharge     Problem: Potential for Falls  Goal: Patient will remain free of falls  Description: INTERVENTIONS:  - Assess patient frequently for physical needs  -  Identify cognitive and physical deficits and behaviors that affect risk of falls.   -  [place] fall precautions as indicated by assessment.  - Educate patient/family on patient safety including physical limitations  - Instruct patient to call for assistance with activity based on assessment  - Modify environment to reduce risk of injury  - Consider OT/PT consult to assist with strengthening/mobility  Outcome: Adequate for Discharge

## 2020-10-11 NOTE — DISCHARGE SUMMARY
Discharge Summary - OB/GYN  Lindy Soliz 30 y.o. female MRN: [id number]  Unit/Bed#: -01 Encounter: [id number]    Admission Date: 10/8/2020     Discharge Date: 10/11/20    Admitting Attending: Teresa Wilson MD    Delivering Attending: Dr. Zahida Torres    Discharging Attending: Dr. Liliam Doty    Admitting Diagnosis:   Patient Active Problem List   Diagnosis   • Vulvar itching   • Uterine fibroid complicating  care, baby not yet delivered in third trimester   • Fetal cardiac echogenic focus, antepartum   • Encounter for supervision of normal first pregnancy in third trimester   • 39 weeks gestation of pregnancy   •  (spontaneous vaginal delivery)   1. Discharge Diagnosis:   Same, delivered    Procedures: spontaneous vaginal delivery    Anesthesia: epidural    Hospital course: Lindy Soliz is a 30 y.o.  at 43w1d who was initially admitted for labor. She progressed to complete cervical dilation and began pushing without complication. She delivered a viable male  on 10/11/2020 at 1152. Weight 7lbs 12.7oz via normal spontaneous vaginal delivery. She sustained a first-degree laceration during delivery which was adequately repaired. Apgars were 9 (1 min) and 9 (5 min).  was transferred to  nursery. Patient tolerated the procedure well. She had a temperature intrapartum. She was treated with Ampicillin and gentamycin x 1 dose each for suspicion of chorioamnionitis. She had no additional fevers. Her post-delivery course was uncomplicated. Her postpartum pain was well controlled with oral analgesics. On day of discharge, she was ambulating and able to reasonably perform all ADLs. She was voiding and had appropriate bowel function. Pain was well controlled. She was discharged home on postpartum day #2 without complications.  Patient was instructed to follow up with her OBGYN as an outpatient and was given appropriate warnings to call provider if she develops signs of infection or uncontrolled pain. Complications: none apparent    Condition at discharge: stable     Provisions for Follow-Up Care:  See after visit summary for information related to follow-up care and any pertinent home health orders. Disposition: Home    Planned Readmission: No    Discharge Medications:   Prenatal vitamin daily for 6 months or the duration of nursing whichever is longer. Motrin 600 mg orally every 6 hours as needed for pain  Tylenol (over the counter) per bottle directions as needed for pain  Hydrocortisone cream 1% (over the counter) applied 1-2x daily to hemorrhoids as needed  Witch hazel pads for hemorrhoidal discomfort as needed    Please see AVS for a complete list of discharge medications. Discharge instructions :   -Do not place anything (no intercourse, tampons or douche) in your vagina for at least 6 weeks. -You may walk for exercise for the first 6 weeks then gradually return to your usual activities.   -You may take baths or shower per your preference.   -Please look at your bust (breasts) in the mirror daily and call provider for redness or tenderness or increased warmth.   -Please call your provider if temperature > 100.4*F or 38* C, worsening pain or a foul discharge.  -Please follow up in 3-4 weeks for your postpartum visit.     Quintin Christiansen MD  PGY-1, OB/GYN  10/11/2020   8:34 AM

## 2020-10-11 NOTE — PROGRESS NOTES
Progress Note - OB/GYN   Cathie Fisher 30 y.o. female MRN: [id number]  Unit/Bed#: -16 Encounter: [id number]    Assessment:  Post partum Day #2 s/p , stable, baby in room    Plan:  1) Continue routine post partum care   Encourage ambulation   Encourage breastfeeding   Anticipate discharge today   2) Suspected Chorioamnionitis   Last fever 10/9/20 @ 1030: 100.4   S/P Ampicillin & Gentamycin x 1 dose    Subjective/Objective   Chief Complaint:     Post delivery. Patient is doing well. Lochia Within normal limits. Pain well controlled with current regimen of oral analgesics. Subjective:     Pain: yes, cramping, improved with meds  Tolerating PO: yes  Voiding: yes  Flatus: yes  Bowel Movement: yes  Ambulating: yes  Chest pain: no  Shortness of breath: no  Leg pain: no  Lochia: minimal  Infant feeding: breast    Objective:     Vitals: /89 (BP Location: Right arm)   Pulse 99   Temp 98 °F (36.7 °C) (Oral)   Resp 18   Ht 5' 4" (1.626 m)   Wt 98.4 kg (217 lb)   LMP 01/15/2020 (Exact Date)   SpO2 99%   Breastfeeding Yes   BMI 37.25 kg/m²     No intake or output data in the 24 hours ending 10/11/20 0824    Lab Results   Component Value Date    WBC 9.21 10/08/2020    HGB 13.5 10/08/2020    HCT 40.0 10/08/2020    MCV 89 10/08/2020     10/08/2020       Physical Exam:     General: alert and oriented x3, in no apparent distress  Cardiovascular: regular rate and rhythm  Lungs: clear to auscultation bilaterally  Abdomen: Soft, non-tender, non-distended, no rebound or guarding. Uterine fundus firm and non-tender, at the umbilicus.    Extremities: Non tender    Nicki Gutierrez MD  10/11/2020  8:24 AM

## 2020-10-11 NOTE — DISCHARGE INSTRUCTIONS
Vaginal Delivery   WHAT YOU SHOULD KNOW:   A vaginal delivery is the birth of your baby through your vagina (birth canal). AFTER YOU LEAVE:   Medicines:  · NSAIDs  help decrease swelling and pain or fever. This medicine is available with or without a doctor's order. NSAIDs can cause stomach bleeding or kidney problems in certain people. If you take blood thinner medicine, always ask your healthcare provider if NSAIDs are safe for you. Always read the medicine label and follow directions. · Take your medicine as directed. Call your healthcare provider if you think your medicine is not helping or if you have side effects. Tell him if you are allergic to any medicine. Keep a list of the medicines, vitamins, and herbs you take. Include the amounts, and when and why you take them. Bring the list or the pill bottles to follow-up visits. Carry your medicine list with you in case of an emergency. Follow up with your primary healthcare provider:  Most women need to return 6 weeks after a vaginal delivery. Ask about how to care for your wounds or stitches. Write down your questions so you remember to ask them during your visits. Activity:  Rest as much as possible. Try to keep all activities short. You may be able to do some exercise soon after you have your baby. Talk with your primary healthcare provider before you start exercising. If you work outside the home, ask when you can return to your job. Kegel exercises:  Kegel exercises may help your vaginal and rectal muscles heal faster. You can do Kegel exercises by tightening and relaxing the muscles around your vagina. Kegel exercises help make the muscles stronger. Breast care:  When your milk comes in, your breasts may feel full and hard. Ask how to care for your breasts, even if you are not breastfeeding. Constipation:  Do not try to push the bowel movement out if it is too hard.  High-fiber foods, extra liquids, and regular exercise can help you prevent constipation. Examples of high-fiber foods are fruit and bran. Prune juice and water are good liquids to drink. Regular exercise helps your digestive system work. You may also be told to take over-the-counter fiber and stool softener medicines. Take these items as directed. Hemorrhoids:  Pregnancy can cause severe hemorrhoids. You may have rectal pain because of the hemorrhoids. Ask how to prevent or treat hemorrhoids. Perineum care: Your perineum is the area between your vagina and anus. Keep the area clean and dry to help it heal and to prevent infection. Wash the area gently with soap and water when you bathe or shower. Rinse your perineum with warm water when you use the toilet. Your primary healthcare provider may suggest you use a warm sitz bath to help decrease pain. A sitz bath is a bathtub or basin filled to hip level. Stay in the sitz bath for 20 to 30 minutes, or as directed. Vaginal discharge: You will have vaginal discharge, called lochia, after your delivery. The lochia is bright red the first day or two after the birth. By the fourth day, the amount decreases, and it turns red-brown. Use a sanitary pad rather than a tampon to prevent a vaginal infection. It is normal to have lochia up to 8 weeks after your baby is born. Monthly periods: Your period may start again within 7 to 12 weeks after your baby is born. If you are breastfeeding, it may take longer for your period to start again. You can still get pregnant again even though you do not have your monthly period. Talk with your primary healthcare provider about a birth control method that will be good for you if you do not want to get pregnant. Mood changes: Many new mothers have some kind of mood changes after delivery. Some of these changes occur because of lack of sleep, hormone changes, and caring for a new baby. Some mood changes can be more serious, such as postpartum depression.  Talk with your primary healthcare provider if you feel unable to care for yourself or your baby. Sexual activity:  You may need to avoid sex for 6 to 7 weeks after you have your baby. You may notice you have a decreased desire for sex, or sex may be painful. You may need to use a vaginal lubricant (gel) to help make sex more comfortable. Contact your primary healthcare provider if:   · You have heavy vaginal bleeding that fills 1 or more sanitary pads in 1 hour. · You have a fever. · Your pain does not go away, or gets worse. · The skin between your vagina and rectum is swollen, warm, or red. · You have swollen, hard, or painful breasts. · You feel very sad or depressed. · You feel more tired than usual.     · You have questions or concerns about your condition or care. Seek care immediately or call 911 if:   · You have pus or yellow drainage coming from your vagina or wound. · You are urinating very little, or not at all. · Your arm or leg feels warm, tender, and painful. It may look swollen and red. · You feel lightheaded, have sudden and worsening chest pain, or trouble breathing. You may have more pain when you take deep breaths or cough, or you may cough up blood. © 2014 [address] [facility] is for End User's use only and may not be sold, redistributed or otherwise used for commercial purposes. All illustrations and images included in CareNotes® are the copyrighted property of [employer] or Vladislav Dave. The above information is an  only. It is not intended as medical advice for individual conditions or treatments. Talk to your doctor, nurse or pharmacist before following any medical regimen to see if it is safe and effective for you.

## 2020-10-11 NOTE — UTILIZATION REVIEW
Notification of Maternity/Delivery & Dorsey Birth Information for Admission   Notification of Maternity/Delivery for Admission to our facility [facility]. Be advised that this patient was admitted to our facility under Inpatient Status. Contact Jensen Perales at [phone] for additional admission information. [facility] PARENT/CHILD HEALTH  DEPT DEDICATED Alexey Bhatt [phone]. Mother &  Information   Patient Name: Meryle Salisbury   YOB: 1985   Delivering clinician: Pastora Matson   OB History        1    Para   1    Term   1       0    AB   0    Living   1       SAB   0    TAB   0    Ectopic   0    Multiple   0    Live Births   1           Obstetric Comments    CF negative           Dorsey Name & MRN:   Information for the patient's :  Brittnee Kristen) [[id number]]     Dorsey Delivery Information:  Sex: male  Delivered 10/9/2020 11:52 AM by Vaginal, Spontaneous; Gestational Age: 43w1d    Dorsey Measurements:  Weight: 7 lb 12.7 oz (3535 g); Height: 20"    APGAR 1 minute 5 minutes 10 minutes   Totals: 9 9      Dorsey Birth Information: 30 y.o. female MRN: [id number] Unit/Bed#: -01 Estimated Date of Delivery: 10/21/20  Birthweight: No birth weight on file.  Gestational Age: <None> Delivery Type: Vaginal, Spontaneous      Authorization Information   Servicing Facility:   [address] [place] [address]  Tax ID: [id number]  NPI: [id number] Attending Provider/NPI:  Phone:  Address: [place] [address]   [phone]  Same as [facility] of Service Code:  24 Place of Service Name: [address] [place] [address]   Start Date: 10/8/20 2021   Discharge Date & Time: 10/11/2020  1:45 PM    Type of Admission: Inpatient Status Discharge Disposition (if discharged): Home/Self Care   Patient Diagnoses:   38 weeks gestation of pregnancy [Z3A.38]  The encounter diagnosis was  (spontaneous vaginal delivery). 1.  (spontaneous vaginal delivery)       Orders: Admission Orders (From admission, onward)     Ordered        10/08/20 2021  Inpatient Admission  Once                    Assigned Utilization Review Contact: Sara Jin  Utilization   Network Utilization Review Department  Phone: [phone]; Fax [phone]  Email: [email or web address]

## 2020-10-16 NOTE — DISCHARGE SUMMARY
Discharge Summary - OB/GYN  Sukhjinder Toscano 30 y.o. female MRN: [id number]  Unit/Bed#: -01 Encounter: [id number]    Admission Date: 10/8/2020     Discharge Date: 10/11/20    Admitting Attending: No att. providers found    Delivering Attending: Dr. Juarez     Discharging Attending: Dr. Karyle Sell    Admitting Diagnosis:   44 weeks gestation of pregnancy  Fetal cardiac echogenic focus    Discharge Diagnosis:   Same, delivered    Procedures: spontaneous vaginal delivery    Anesthesia: epidural    Hospital course: Sukhjinder Toscano is a 30 y.o.  at 43w1d who was initially admitted for labor. She progressed to complete cervical dilation and began pushing without complication. She delivered a viable male  on 10/9/20 at 1152. Weight 7lbs 12.7oz via normal spontaneous vaginal delivery. She sustained a first-degree laceration during delivery which was adequately repaired. Apgars were 9 (1 min) and 9 (5 min).  was transferred to  nursery. Patient tolerated the procedure well. She had a temperature intrapartum. She was treated with Ampicillin and gentamycin x 1 dose each for suspicion of chorioamnionitis. She had no additional fevers. Her post-delivery course was uncomplicated. Her postpartum pain was well controlled with oral analgesics. On day of discharge, she was ambulating and able to reasonably perform all ADLs. She was voiding and had appropriate bowel function. Pain was well controlled. She was discharged home on postpartum day #2 without complications. Patient was instructed to follow up with her OBGYN as an outpatient and was given appropriate warnings to call provider if she develops signs of infection or uncontrolled pain. Complications: none apparent    Condition at discharge: stable     Provisions for Follow-Up Care:  See after visit summary for information related to follow-up care and any pertinent home health orders.       Disposition: Home    Planned Readmission: No    Discharge Medications:   Prenatal vitamin daily for 6 months or the duration of nursing whichever is longer. Motrin 600 mg orally every 6 hours as needed for pain  Tylenol (over the counter) per bottle directions as needed for pain  Hydrocortisone cream 1% (over the counter) applied 1-2x daily to hemorrhoids as needed  Witch hazel pads for hemorrhoidal discomfort as needed    Please see AVS for a complete list of discharge medications. Discharge instructions :   -Do not place anything (no intercourse, tampons or douche) in your vagina for at least 6 weeks. -You may walk for exercise for the first 6 weeks then gradually return to your usual activities.   -You may take baths or shower per your preference.   -Please look at your bust (breasts) in the mirror daily and call provider for redness or tenderness or increased warmth.   -Please call your provider if temperature > 100.4*F or 38* C, worsening pain or a foul discharge.  -Please follow up in 3-4 weeks for your postpartum visit.     Elle Burgos MD  PGY-1, OB/GYN  10/16/2020   12:54 PM

## 2020-10-19 NOTE — DISCHARGE SUMMARY
Discharge Summary - OB/GYN  Kylah Reza 30 y.o. female MRN: [id number]  Unit/Bed#: -01 Encounter: [id number]    Admission Date: 10/8/2020     Discharge Date: 10/11/20    Admitting Attending: Dr. Leticia Casillas    Delivering Attending: Dr. Leticia Casillas    Discharging Attending: Dr. Mabel Caraballo    Admitting Diagnosis:   44 weeks gestation of pregnancy  Fetal cardiac echogenic focus    Discharge Diagnosis:   Same, delivered    Procedures: spontaneous vaginal delivery    Anesthesia: epidural    Hospital course: Kylah Reza is a 30 y.o.  at 43w1d who was initially admitted for labor. She progressed to complete cervical dilation and began pushing without complication. She delivered a viable male  on 10/9/20 at 1152. Weight 7lbs 12.7oz via normal spontaneous vaginal delivery. She sustained a first-degree laceration during delivery which was adequately repaired. Apgars were 9 (1 min) and 9 (5 min).  was transferred to  nursery. Patient tolerated the procedure well. She had a temperature intrapartum. She was treated with Ampicillin and gentamycin x 1 dose each for suspicion of chorioamnionitis. She had no additional fevers. Her post-delivery course was uncomplicated. Her postpartum pain was well controlled with oral analgesics. On day of discharge, she was ambulating and able to reasonably perform all ADLs. She was voiding and had appropriate bowel function. Pain was well controlled. She was discharged home on postpartum day #2 without complications. Patient was instructed to follow up with her OBGYN as an outpatient and was given appropriate warnings to call provider if she develops signs of infection or uncontrolled pain. Complications: none apparent    Condition at discharge: stable     Provisions for Follow-Up Care:  See after visit summary for information related to follow-up care and any pertinent home health orders.       Disposition: Home    Planned Readmission: No    Discharge Medications:   Prenatal vitamin daily for 6 months or the duration of nursing whichever is longer. Motrin 600 mg orally every 6 hours as needed for pain  Tylenol (over the counter) per bottle directions as needed for pain  Hydrocortisone cream 1% (over the counter) applied 1-2x daily to hemorrhoids as needed  Witch hazel pads for hemorrhoidal discomfort as needed    Please see AVS for a complete list of discharge medications. Discharge instructions :   -Do not place anything (no intercourse, tampons or douche) in your vagina for at least 6 weeks. -You may walk for exercise for the first 6 weeks then gradually return to your usual activities.   -You may take baths or shower per your preference.   -Please look at your bust (breasts) in the mirror daily and call provider for redness or tenderness or increased warmth.   -Please call your provider if temperature > 100.4*F or 38* C, worsening pain or a foul discharge.  -Please follow up in 3-4 weeks for your postpartum visit.     Alda James MD  PGY-1, OB/GYN  10/19/2020   6:38 PM

## 2020-11-13 ENCOUNTER — POSTPARTUM VISIT (OUTPATIENT)
Dept: OBGYN CLINIC | Facility: CLINIC | Age: 35
End: 2020-11-13

## 2020-11-13 VITALS
TEMPERATURE: 97.6 F | SYSTOLIC BLOOD PRESSURE: 120 MMHG | DIASTOLIC BLOOD PRESSURE: 82 MMHG | HEIGHT: 64 IN | BODY MASS INDEX: 34.15 KG/M2 | WEIGHT: 200 LBS

## 2020-11-13 DIAGNOSIS — Z30.011 ENCOUNTER FOR INITIAL PRESCRIPTION OF CONTRACEPTIVE PILLS: ICD-10-CM

## 2020-11-13 PROCEDURE — 99024 POSTOP FOLLOW-UP VISIT: CPT | Performed by: OBSTETRICS & GYNECOLOGY

## 2020-11-13 RX ORDER — NORETHINDRONE ACETATE AND ETHINYL ESTRADIOL 1MG-20(21)
1 KIT ORAL DAILY
Qty: 84 TABLET | Refills: 1 | Status: SHIPPED | OUTPATIENT
Start: 2020-11-13 | End: 2021-04-06

## 2021-04-06 ENCOUNTER — ANNUAL EXAM (OUTPATIENT)
Dept: OBGYN CLINIC | Facility: CLINIC | Age: 36
End: 2021-04-06
Payer: COMMERCIAL

## 2021-04-06 VITALS
BODY MASS INDEX: 35.51 KG/M2 | HEIGHT: 64 IN | WEIGHT: 208 LBS | SYSTOLIC BLOOD PRESSURE: 126 MMHG | DIASTOLIC BLOOD PRESSURE: 84 MMHG

## 2021-04-06 DIAGNOSIS — N90.89 VULVAR IRRITATION: ICD-10-CM

## 2021-04-06 DIAGNOSIS — Z01.419 ROUTINE GYNECOLOGICAL EXAMINATION: Primary | ICD-10-CM

## 2021-04-06 DIAGNOSIS — Z11.3 SCREENING EXAMINATION FOR VENEREAL DISEASE: ICD-10-CM

## 2021-04-06 DIAGNOSIS — N89.8 VAGINAL DISCHARGE: ICD-10-CM

## 2021-04-06 DIAGNOSIS — N89.8 VAGINAL ITCHING: ICD-10-CM

## 2021-04-06 PROCEDURE — 99395 PREV VISIT EST AGE 18-39: CPT | Performed by: PHYSICIAN ASSISTANT

## 2021-04-06 PROCEDURE — G0145 SCR C/V CYTO,THINLAYER,RESCR: HCPCS | Performed by: PHYSICIAN ASSISTANT

## 2021-04-06 PROCEDURE — 87491 CHLMYD TRACH DNA AMP PROBE: CPT | Performed by: PHYSICIAN ASSISTANT

## 2021-04-06 PROCEDURE — 87591 N.GONORRHOEAE DNA AMP PROB: CPT | Performed by: PHYSICIAN ASSISTANT

## 2021-04-06 PROCEDURE — 81513 NFCT DS BV RNA VAG FLU ALG: CPT | Performed by: PHYSICIAN ASSISTANT

## 2021-04-06 RX ORDER — CLOBETASOL PROPIONATE 0.5 MG/G
OINTMENT TOPICAL AS NEEDED
Qty: 45 G | Refills: 0 | Status: SHIPPED | OUTPATIENT
Start: 2021-04-06 | End: 2021-08-17

## 2021-04-06 NOTE — PROGRESS NOTES
Assessment/Plan   Problem List Items Addressed This Visit        Other    Routine gynecological examination - Primary     Pap guidelines reviewed  Pap with reflex and STD cultures done today as well as vaginitis panel  Office will call with results and appropriate follow up  Reviewed exam findings most consistent with lichen sclerosus  Recommend Clobetasol thin layer on affected area three times daily until symptoms improve than decrease to twice a day for 3-7 days, then decrease to daily for 3-7 days  In between can used hydrocortisone over the counter and/or coconut oil  Return to office for annual or as needed  Relevant Orders    Liquid-based pap, screening      Other Visit Diagnoses     Vaginal itching        Relevant Medications    clobetasol (TEMOVATE) 0 05 % ointment    Screening examination for venereal disease        Relevant Orders    Chlamydia/GC amplified DNA by PCR    Vulvar irritation        Relevant Medications    clobetasol (TEMOVATE) 0 05 % ointment    Vaginal discharge        Relevant Orders    VAGINOSIS DNA PROBE (AFFIRM)          Subjective:     Patient ID: Sylvie Price is a 37 y o  y o  female  HPI  38 yo seen for annual exam  6 months PP doing well   for first 6 weeks  Menses regular with no issues  Fibroid uterus in pregnancy  Previously diagnosed with lichen sclerosus  Patient reports had tried Clobetasol ointment in the past BID and worked initially but then felt like itching came back  Would like testing for everything today, does reports some vaginal discharge  No odors, pelvic pain, fever, chills, bowel or bladder issues  Last pap: 2019 NILM (-)HRHPV  The following portions of the patient's history were reviewed and updated as appropriate:   She  has a past medical history of Lichen sclerosus of female genitalia    She   Patient Active Problem List    Diagnosis Date Noted    Routine gynecological examination 2021     (spontaneous vaginal delivery) 10/09/2020    39 weeks gestation of pregnancy 10/08/2020    Encounter for supervision of normal first pregnancy in third trimester 2020    Fetal cardiac echogenic focus, antepartum 2020    Uterine fibroid complicating  care, baby not yet delivered in third trimester 2020    Vulvar itching 2018     She  has a past surgical history that includes [place] tooth extraction  Her family history includes Diabetes in her mother  She  reports that she has never smoked  She has never used smokeless tobacco  She reports previous alcohol use  She reports that she does not use drugs  Current Outpatient Medications   Medication Sig Dispense Refill    clobetasol (TEMOVATE) 0 05 % ointment Apply topically as needed (vulvar irritation) TID until symptoms improve, than BID for 3-7 days and QD for 3-7 days 45 g 0    Prenat w/o [id number] (PNV OB+DHA PO) Take by mouth       No current facility-administered medications for this visit  She is allergic to diphenhydramine       Menstrual History:  OB History        1    Para   1    Term   1       0    AB   0    Living   1       SAB   0    TAB   0    Ectopic   0    Multiple   0    Live Births   1           Obstetric Comments    CF negative              Patient's last menstrual period was 2021 (exact date)  Review of Systems   Constitutional: Negative for fatigue, fever and unexpected weight change  HENT: Negative for dental problem and sinus pressure  Eyes: Negative for visual disturbance  Respiratory: Negative for cough, shortness of breath and wheezing  Cardiovascular: Negative for chest pain  Gastrointestinal: Negative for abdominal pain, blood in stool, constipation, diarrhea, nausea and vomiting  Endocrine: Negative for polydipsia  Genitourinary: Negative for difficulty urinating, dyspareunia, dysuria, frequency, hematuria, pelvic pain and urgency     Musculoskeletal: Negative for arthralgias and back pain  Neurological: Negative for dizziness, seizures, light-headedness and headaches  Psychiatric/Behavioral: Negative for suicidal ideas  The patient is not nervous/anxious  Objective:  Vitals:    04/06/21 1532   BP: 126/84   BP Location: Left arm   Patient Position: Sitting   Cuff Size: Standard   Weight: 94 3 kg (208 lb)   Height: 5' 4" (1 626 m)      Physical Exam  Constitutional:       Appearance: Normal appearance  She is well-developed  Genitourinary:      Urethra, bladder, vagina and uterus normal       Vulval lesion and rash present  No vulval condylomata, tenderness, ulcerations or Bartholin's cyst noted  No signs of labial injury  No labial fusion  No inguinal adenopathy present in the right or left side  No urethral prolapse, pain, swelling, tenderness, caruncle, mass or diverticulum present  Bladder is not distended or tender  No signs of injury or lesions in the vagina  No vaginal discharge, erythema, tenderness or bleeding  No cervical motion tenderness, discharge or lesion  Uterus is not enlarged, tender, irregular or mobile  No uterine mass detected  No right or left adnexal mass present  Right adnexa not tender or full  Left adnexa not tender or full  HENT:      Head: Normocephalic and atraumatic  Neck:      Thyroid: No thyromegaly  Cardiovascular:      Rate and Rhythm: Normal rate and regular rhythm  Heart sounds: Normal heart sounds  No murmur  No friction rub  No gallop  Pulmonary:      Effort: Pulmonary effort is normal  No respiratory distress  Breath sounds: Normal breath sounds  No wheezing  Chest:      Breasts: Breasts are symmetrical          Right: Normal  No swelling, bleeding, inverted nipple, mass, nipple discharge, skin change or tenderness           Left: Normal  No swelling, bleeding, inverted nipple, mass, nipple discharge, skin change or tenderness  Abdominal:      General: There is no distension  Palpations: Abdomen is soft  There is no mass  Tenderness: There is no abdominal tenderness  There is no guarding or rebound  Hernia: No hernia is present  Lymphadenopathy:      Cervical: No cervical adenopathy  Upper Body:      Right upper body: No supraclavicular, axillary or pectoral adenopathy  Left upper body: No supraclavicular, axillary or pectoral adenopathy  Lower Body: No right inguinal adenopathy  No left inguinal adenopathy  Neurological:      Mental Status: She is alert and oriented to person, place, and time  Skin:     General: Skin is warm and dry     Psychiatric:         Behavior: Behavior normal

## 2021-04-06 NOTE — ASSESSMENT & PLAN NOTE
Pap guidelines reviewed  Pap with reflex and STD cultures done today as well as vaginitis panel  Office will call with results and appropriate follow up  Reviewed exam findings most consistent with lichen sclerosus  Recommend Clobetasol thin layer on affected area three times daily until symptoms improve than decrease to twice a day for 3-7 days, then decrease to daily for 3-7 days  In between can used hydrocortisone over the counter and/or coconut oil  Return to office for annual or as needed

## 2021-04-06 NOTE — PATIENT INSTRUCTIONS
Clobetasol thin layer on affected area three times daily until symptoms improve than decrease to twice a day for 3-7 days, then decrease to daily for 3-7 days  In between can used hydrocortisone over the counter and/or coconut oil

## 2021-04-07 LAB
C TRACH DNA SPEC QL NAA+PROBE: NEGATIVE
N GONORRHOEA DNA SPEC QL NAA+PROBE: NEGATIVE

## 2021-04-08 LAB
CANDIDA RRNA VAG QL PROBE: NEGATIVE
G VAGINALIS RRNA GENITAL QL PROBE: NEGATIVE
T VAGINALIS RRNA GENITAL QL PROBE: NEGATIVE

## 2021-04-12 LAB
LAB AP GYN PRIMARY INTERPRETATION: NORMAL
LAB AP LMP: NORMAL
Lab: NORMAL

## 2022-10-12 PROBLEM — Z01.419 ROUTINE GYNECOLOGICAL EXAMINATION: Status: RESOLVED | Noted: 2021-04-06 | Resolved: 2022-10-12
